# Patient Record
Sex: FEMALE | Race: WHITE | NOT HISPANIC OR LATINO
[De-identification: names, ages, dates, MRNs, and addresses within clinical notes are randomized per-mention and may not be internally consistent; named-entity substitution may affect disease eponyms.]

---

## 2017-01-24 ENCOUNTER — APPOINTMENT (OUTPATIENT)
Dept: PULMONOLOGY | Facility: CLINIC | Age: 62
End: 2017-01-24

## 2017-02-28 ENCOUNTER — APPOINTMENT (OUTPATIENT)
Dept: PULMONOLOGY | Facility: CLINIC | Age: 62
End: 2017-02-28

## 2017-03-21 ENCOUNTER — APPOINTMENT (OUTPATIENT)
Dept: PULMONOLOGY | Facility: CLINIC | Age: 62
End: 2017-03-21

## 2017-03-21 VITALS
OXYGEN SATURATION: 99 % | SYSTOLIC BLOOD PRESSURE: 128 MMHG | HEART RATE: 70 BPM | BODY MASS INDEX: 40.77 KG/M2 | WEIGHT: 245 LBS | DIASTOLIC BLOOD PRESSURE: 80 MMHG

## 2017-04-13 ENCOUNTER — FORM ENCOUNTER (OUTPATIENT)
Age: 62
End: 2017-04-13

## 2017-04-14 ENCOUNTER — OUTPATIENT (OUTPATIENT)
Dept: OUTPATIENT SERVICES | Facility: HOSPITAL | Age: 62
LOS: 1 days | End: 2017-04-14
Payer: COMMERCIAL

## 2017-04-14 ENCOUNTER — APPOINTMENT (OUTPATIENT)
Dept: CT IMAGING | Facility: CLINIC | Age: 62
End: 2017-04-14

## 2017-04-14 DIAGNOSIS — Z00.8 ENCOUNTER FOR OTHER GENERAL EXAMINATION: ICD-10-CM

## 2017-04-14 PROCEDURE — 71250 CT THORAX DX C-: CPT

## 2017-08-22 ENCOUNTER — MED ADMIN CHARGE (OUTPATIENT)
Age: 62
End: 2017-08-22

## 2017-08-22 ENCOUNTER — MEDICATION RENEWAL (OUTPATIENT)
Age: 62
End: 2017-08-22

## 2017-09-21 ENCOUNTER — RX RENEWAL (OUTPATIENT)
Age: 62
End: 2017-09-21

## 2017-09-22 ENCOUNTER — MEDICATION RENEWAL (OUTPATIENT)
Age: 62
End: 2017-09-22

## 2017-09-25 ENCOUNTER — MEDICATION RENEWAL (OUTPATIENT)
Age: 62
End: 2017-09-25

## 2017-10-21 ENCOUNTER — RX RENEWAL (OUTPATIENT)
Age: 62
End: 2017-10-21

## 2018-07-06 ENCOUNTER — APPOINTMENT (OUTPATIENT)
Dept: PULMONOLOGY | Facility: CLINIC | Age: 63
End: 2018-07-06
Payer: COMMERCIAL

## 2018-07-06 ENCOUNTER — RX RENEWAL (OUTPATIENT)
Age: 63
End: 2018-07-06

## 2018-07-06 VITALS — WEIGHT: 241 LBS | DIASTOLIC BLOOD PRESSURE: 80 MMHG | BODY MASS INDEX: 40.1 KG/M2 | SYSTOLIC BLOOD PRESSURE: 140 MMHG

## 2018-07-06 VITALS — HEART RATE: 66 BPM | OXYGEN SATURATION: 95 %

## 2018-07-06 PROCEDURE — 94010 BREATHING CAPACITY TEST: CPT

## 2018-07-06 PROCEDURE — 99215 OFFICE O/P EST HI 40 MIN: CPT | Mod: 25

## 2018-07-06 RX ORDER — AZELASTINE HYDROCHLORIDE AND FLUTICASONE PROPIONATE 137; 50 UG/1; UG/1
137-50 SPRAY, METERED NASAL TWICE DAILY
Qty: 1 | Refills: 5 | Status: DISCONTINUED | COMMUNITY
Start: 2018-07-06 | End: 2018-07-06

## 2018-07-06 RX ORDER — METOPROLOL TARTRATE 75 MG/1
TABLET, FILM COATED ORAL
Refills: 0 | Status: ACTIVE | COMMUNITY

## 2018-07-16 ENCOUNTER — FORM ENCOUNTER (OUTPATIENT)
Age: 63
End: 2018-07-16

## 2018-07-17 ENCOUNTER — APPOINTMENT (OUTPATIENT)
Dept: CT IMAGING | Facility: CLINIC | Age: 63
End: 2018-07-17
Payer: COMMERCIAL

## 2018-07-17 ENCOUNTER — OUTPATIENT (OUTPATIENT)
Dept: OUTPATIENT SERVICES | Facility: HOSPITAL | Age: 63
LOS: 1 days | End: 2018-07-17
Payer: COMMERCIAL

## 2018-07-17 DIAGNOSIS — R93.8 ABNORMAL FINDINGS ON DIAGNOSTIC IMAGING OF OTHER SPECIFIED BODY STRUCTURES: ICD-10-CM

## 2018-07-17 PROCEDURE — 71250 CT THORAX DX C-: CPT | Mod: 26

## 2018-07-17 PROCEDURE — 71250 CT THORAX DX C-: CPT

## 2018-11-18 ENCOUNTER — FORM ENCOUNTER (OUTPATIENT)
Age: 63
End: 2018-11-18

## 2018-11-19 ENCOUNTER — OUTPATIENT (OUTPATIENT)
Dept: OUTPATIENT SERVICES | Facility: HOSPITAL | Age: 63
LOS: 1 days | End: 2018-11-19
Payer: COMMERCIAL

## 2018-11-19 ENCOUNTER — APPOINTMENT (OUTPATIENT)
Dept: CT IMAGING | Facility: CLINIC | Age: 63
End: 2018-11-19
Payer: COMMERCIAL

## 2018-11-19 DIAGNOSIS — R93.89 ABNORMAL FINDINGS ON DIAGNOSTIC IMAGING OF OTHER SPECIFIED BODY STRUCTURES: ICD-10-CM

## 2018-11-19 PROCEDURE — 71250 CT THORAX DX C-: CPT

## 2018-11-19 PROCEDURE — 71250 CT THORAX DX C-: CPT | Mod: 26

## 2019-03-11 ENCOUNTER — APPOINTMENT (OUTPATIENT)
Dept: PULMONOLOGY | Facility: CLINIC | Age: 64
End: 2019-03-11
Payer: COMMERCIAL

## 2019-03-11 VITALS — OXYGEN SATURATION: 98 % | HEART RATE: 70 BPM

## 2019-03-11 VITALS — WEIGHT: 239 LBS | HEIGHT: 64.5 IN | BODY MASS INDEX: 40.31 KG/M2

## 2019-03-11 PROCEDURE — 94729 DIFFUSING CAPACITY: CPT

## 2019-03-11 PROCEDURE — 94727 GAS DIL/WSHOT DETER LNG VOL: CPT

## 2019-03-11 PROCEDURE — 99215 OFFICE O/P EST HI 40 MIN: CPT | Mod: 25

## 2019-03-11 PROCEDURE — 85018 HEMOGLOBIN: CPT | Mod: QW

## 2019-03-11 PROCEDURE — 94010 BREATHING CAPACITY TEST: CPT

## 2019-03-11 RX ORDER — POLYMYXIN B SULFATE AND TRIMETHOPRIM 10000; 1 [USP'U]/ML; MG/ML
10000-0.1 SOLUTION OPHTHALMIC
Qty: 10 | Refills: 0 | Status: DISCONTINUED | COMMUNITY
Start: 2019-02-16

## 2019-03-11 NOTE — DISCUSSION/SUMMARY
[Asthma] : asthma [Stable] : stable [Responding to Treatment] : responding to treatment [Mild Persistent] : mild persistent [None] : There are no changes in medication management [de-identified] : sinisitis and bronchietesis

## 2019-03-11 NOTE — HISTORY OF PRESENT ILLNESS
[Mild Persistent] : mild persistent [None] : The patient is not currently on any medications for ~his/her~ asthma [de-identified] : Rheumatoid arthrits , pulmonary nodules, bronchiectasis [de-identified] : greatest sputum in am

## 2019-03-11 NOTE — PHYSICAL EXAM
[General Appearance - Well Developed] : well developed [Normal Appearance] : normal appearance [Well Groomed] : well groomed [General Appearance - Well Nourished] : well nourished [No Deformities] : no deformities [General Appearance - In No Acute Distress] : no acute distress [Normal Conjunctiva] : the conjunctiva exhibited no abnormalities [Eyelids - No Xanthelasma] : the eyelids demonstrated no xanthelasmas [Normal Oropharynx] : normal oropharynx [Neck Appearance] : the appearance of the neck was normal [Neck Cervical Mass (___cm)] : no neck mass was observed [Jugular Venous Distention Increased] : there was no jugular-venous distention [Thyroid Diffuse Enlargement] : the thyroid was not enlarged [Thyroid Nodule] : there were no palpable thyroid nodules [Heart Rate And Rhythm] : heart rate and rhythm were normal [Heart Sounds] : normal S1 and S2 [Murmurs] : no murmurs present [Respiration, Rhythm And Depth] : normal respiratory rhythm and effort [Exaggerated Use Of Accessory Muscles For Inspiration] : no accessory muscle use [Auscultation Breath Sounds / Voice Sounds] : lungs were clear to auscultation bilaterally [Abdomen Soft] : soft [Abdomen Tenderness] : non-tender [Abdomen Mass (___ Cm)] : no abdominal mass palpated [Abnormal Walk] : normal gait [Gait - Sufficient For Exercise Testing] : the gait was sufficient for exercise testing [Nail Clubbing] : no clubbing of the fingernails [Cyanosis, Localized] : no localized cyanosis [Petechial Hemorrhages (___cm)] : no petechial hemorrhages [Skin Color & Pigmentation] : normal skin color and pigmentation [] : no rash [No Venous Stasis] : no venous stasis [Skin Lesions] : no skin lesions [No Skin Ulcers] : no skin ulcer [No Xanthoma] : no  xanthoma was observed [Deep Tendon Reflexes (DTR)] : deep tendon reflexes were 2+ and symmetric [Sensation] : the sensory exam was normal to light touch and pinprick [No Focal Deficits] : no focal deficits [FreeTextEntry1] : obese black female

## 2019-03-11 NOTE — CONSULT LETTER
[Dear  ___] : Dear  [unfilled], [Consult Letter:] : I had the pleasure of evaluating your patient, [unfilled]. [Please see my note below.] : Please see my note below. [Sincerely,] : Sincerely, [DrFransisco  ___] : Dr. AMIN [FreeTextEntry3] : [facility]par

## 2019-03-11 NOTE — PROCEDURE
[FreeTextEntry1] : EXAM: CT CHEST \par \par \par PROCEDURE DATE: 11/19/2018 \par \par \par \par INTERPRETATION: CLINICAL INDICATION: Follow-up of new nodules, lupus. \par \par Axial CT images of the chest are obtained without intravenous administration \par of contrast. \par \par Correlation is made with prior studies including the chest CT of July 17, \par 2018. \par \par There are no pathologically enlarged bilateral axillary lymph nodes. There \par are subcentimeter mediastinal lymph nodes which are unchanged since April \par 14, 2017. The heart size is normal. There is no pericardial effusion. There \par are no pleural effusions. \par \par Evaluation of the upper abdominal organs demonstrate no significant \par abnormality. \par \par Evaluation of the lungs demonstrate minimal bibasilar bronchiectasis \par unchanged since April 14, 2017. There is a 2 mm right upper lobe pulmonary \par nodule on image 39 of series 3 unchanged since April 14, 2017. There is a 2 \par mm left upper lobe pulmonary nodule on image 71 of series 3 unchanged since \par April 14, 2017. There is a 2 mm right upper lobe nodule on image 51 \par unchanged since April 14, 2017. There is interval resolution of the \par previously seen left upper lobe cluster of small nodules or tree-in-bud and \par the previously noted new 2 mm right lower lobe pulmonary nodules since July \par 17, 2018. There are no new lung nodules. There is no pneumonia. There are no \par central endobronchial lesions. \par \par Evaluation of the bones demonstrate degenerative changes of the spine. \par \par IMPRESSION: Interval resolution of the previously noted left upper lobe \par tree-in-bud opacities and the new 2 mm right lower lobe pulmonary nodules \par since July 17, 2018. \par \par A few other 2 mm bilateral pulmonary nodules are unchanged. \par \par \par GRETCHEN SAUNDERS M.D. ATTENDING RADIOLOGIST \par This document has been electronically signed. Nov 19 2018 2:10PM \par Chest CAT scan reviewed on PACS with the patient.\par  \par PFT'S mild pattern of abdominal obesity

## 2019-05-24 ENCOUNTER — MEDICATION RENEWAL (OUTPATIENT)
Age: 64
End: 2019-05-24

## 2019-06-18 ENCOUNTER — RX RENEWAL (OUTPATIENT)
Age: 64
End: 2019-06-18

## 2019-09-16 ENCOUNTER — APPOINTMENT (OUTPATIENT)
Dept: PULMONOLOGY | Facility: CLINIC | Age: 64
End: 2019-09-16

## 2019-11-08 ENCOUNTER — APPOINTMENT (OUTPATIENT)
Dept: PULMONOLOGY | Facility: CLINIC | Age: 64
End: 2019-11-08

## 2020-01-15 ENCOUNTER — RX RENEWAL (OUTPATIENT)
Age: 65
End: 2020-01-15

## 2020-03-31 ENCOUNTER — RX RENEWAL (OUTPATIENT)
Age: 65
End: 2020-03-31

## 2020-06-18 ENCOUNTER — APPOINTMENT (OUTPATIENT)
Dept: PULMONOLOGY | Facility: CLINIC | Age: 65
End: 2020-06-18

## 2020-10-20 ENCOUNTER — APPOINTMENT (OUTPATIENT)
Dept: PULMONOLOGY | Facility: CLINIC | Age: 65
End: 2020-10-20
Payer: COMMERCIAL

## 2020-10-20 VITALS — SYSTOLIC BLOOD PRESSURE: 130 MMHG | DIASTOLIC BLOOD PRESSURE: 70 MMHG | HEART RATE: 63 BPM | OXYGEN SATURATION: 96 %

## 2020-10-20 PROCEDURE — 99214 OFFICE O/P EST MOD 30 MIN: CPT | Mod: 25

## 2020-10-20 PROCEDURE — 99072 ADDL SUPL MATRL&STAF TM PHE: CPT

## 2020-10-20 RX ORDER — MOMETASONE FUROATE 220 UG/1
220 INHALANT RESPIRATORY (INHALATION)
Qty: 3 | Refills: 0 | Status: DISCONTINUED | COMMUNITY
Start: 2020-01-15 | End: 2020-10-20

## 2020-10-20 RX ORDER — MOMETASONE FUROATE 220 UG/1
220 INHALANT RESPIRATORY (INHALATION) DAILY
Qty: 3 | Refills: 1 | Status: DISCONTINUED | COMMUNITY
Start: 2019-05-24 | End: 2020-10-20

## 2020-10-20 RX ORDER — MELOXICAM 7.5 MG/1
7.5 TABLET ORAL
Refills: 0 | Status: DISCONTINUED | COMMUNITY
Start: 2017-03-21 | End: 2020-10-20

## 2020-10-20 NOTE — DISCUSSION/SUMMARY
[Stable] : stable [Asthma] : asthma [Responding to Treatment] : responding to treatment [Mild Persistent] : mild persistent [None] : There are no changes in medication management [FreeTextEntry1] : Needs f/u CCT re bronchiectasis and prior Tree n bud changes [de-identified] : sinisitis and bronchietesis [de-identified] : Trial off asmanex

## 2020-10-20 NOTE — CONSULT LETTER
[Dear  ___] : Dear  [unfilled], [Consult Letter:] : I had the pleasure of evaluating your patient, [unfilled]. [Please see my note below.] : Please see my note below. [Sincerely,] : Sincerely, [FreeTextEntry3] : [facility]

## 2020-10-20 NOTE — HISTORY OF PRESENT ILLNESS
[Mild Persistent] : mild persistent [None] : The patient is not currently on any medications for ~his/her~ asthma [Never] : never [Class I - No Symptoms and No Limitations] : I [de-identified] : Rheumatoid arthrits , pulmonary nodules, bronchiectasis [de-identified] : using Asmanex 1 PPD but no longer cover by insurance

## 2020-11-06 ENCOUNTER — OUTPATIENT (OUTPATIENT)
Dept: OUTPATIENT SERVICES | Facility: HOSPITAL | Age: 65
LOS: 1 days | End: 2020-11-06
Payer: COMMERCIAL

## 2020-11-06 ENCOUNTER — RESULT REVIEW (OUTPATIENT)
Age: 65
End: 2020-11-06

## 2020-11-06 ENCOUNTER — APPOINTMENT (OUTPATIENT)
Dept: CT IMAGING | Facility: CLINIC | Age: 65
End: 2020-11-06
Payer: COMMERCIAL

## 2020-11-06 DIAGNOSIS — R93.89 ABNORMAL FINDINGS ON DIAGNOSTIC IMAGING OF OTHER SPECIFIED BODY STRUCTURES: ICD-10-CM

## 2020-11-06 PROCEDURE — 71250 CT THORAX DX C-: CPT | Mod: 26

## 2020-11-06 PROCEDURE — 71250 CT THORAX DX C-: CPT

## 2020-11-29 ENCOUNTER — OUTPATIENT (OUTPATIENT)
Dept: OUTPATIENT SERVICES | Facility: HOSPITAL | Age: 65
LOS: 1 days | End: 2020-11-29
Payer: COMMERCIAL

## 2020-11-29 ENCOUNTER — APPOINTMENT (OUTPATIENT)
Dept: MRI IMAGING | Facility: CLINIC | Age: 65
End: 2020-11-29
Payer: COMMERCIAL

## 2020-11-29 DIAGNOSIS — Z00.8 ENCOUNTER FOR OTHER GENERAL EXAMINATION: ICD-10-CM

## 2020-11-29 PROCEDURE — 71552 MRI CHEST W/O & W/DYE: CPT

## 2020-11-29 PROCEDURE — A9585: CPT

## 2020-11-29 PROCEDURE — 71552 MRI CHEST W/O & W/DYE: CPT | Mod: 26

## 2020-12-03 ENCOUNTER — NON-APPOINTMENT (OUTPATIENT)
Age: 65
End: 2020-12-03

## 2021-04-09 ENCOUNTER — APPOINTMENT (OUTPATIENT)
Dept: DISASTER EMERGENCY | Facility: CLINIC | Age: 66
End: 2021-04-09

## 2021-04-19 ENCOUNTER — APPOINTMENT (OUTPATIENT)
Dept: DISASTER EMERGENCY | Facility: CLINIC | Age: 66
End: 2021-04-19

## 2021-04-20 LAB — SARS-COV-2 N GENE NPH QL NAA+PROBE: NOT DETECTED

## 2021-04-22 ENCOUNTER — APPOINTMENT (OUTPATIENT)
Dept: PULMONOLOGY | Facility: CLINIC | Age: 66
End: 2021-04-22
Payer: COMMERCIAL

## 2021-04-22 VITALS — HEIGHT: 65 IN | TEMPERATURE: 97.6 F | WEIGHT: 252 LBS | BODY MASS INDEX: 41.99 KG/M2

## 2021-04-22 VITALS — OXYGEN SATURATION: 98 % | HEART RATE: 75 BPM | SYSTOLIC BLOOD PRESSURE: 130 MMHG | DIASTOLIC BLOOD PRESSURE: 88 MMHG

## 2021-04-22 DIAGNOSIS — Z87.09 PERSONAL HISTORY OF OTHER DISEASES OF THE RESPIRATORY SYSTEM: ICD-10-CM

## 2021-04-22 PROCEDURE — 99072 ADDL SUPL MATRL&STAF TM PHE: CPT

## 2021-04-22 PROCEDURE — 99215 OFFICE O/P EST HI 40 MIN: CPT | Mod: 25

## 2021-04-22 PROCEDURE — 94010 BREATHING CAPACITY TEST: CPT

## 2021-04-22 RX ORDER — AZELASTINE HYDROCHLORIDE 137 UG/1
0.1 SPRAY, METERED NASAL DAILY
Qty: 3 | Refills: 1 | Status: ACTIVE | COMMUNITY
Start: 2018-07-06

## 2021-04-22 NOTE — DISCUSSION/SUMMARY
[Asthma] : asthma [Stable] : stable [Responding to Treatment] : responding to treatment [Mild Persistent] : mild persistent [None] : There are no changes in medication management [FreeTextEntry1] : Previous findings of tree in bud formation have resolved without any evidence of bronchiectasis. The thymic lesion noted appears to be a cyst and benign. [de-identified] : sinisitis and bronchietesis [de-identified] : Trial off asmanex

## 2021-04-22 NOTE — HISTORY OF PRESENT ILLNESS
[Never] : never [Class I - No Symptoms and No Limitations] : I [Mild Persistent] : mild persistent [Good Control] : peak flow has been good [None] : The patient is not currently on any medications for ~his/her~ asthma [TextBox_4] : 66-year-old female with a history of mild persistent asthma, seen today in followup for her asthma, as well as an anterior mediastinal mass. She denies any complaints of chest pains, weight loss, fevers, chills, myalgias, arthralgias. [ESS] : 0 [More Frequent Use Needed Recently] : normal [de-identified] : Rheumatoid arthrits

## 2021-04-22 NOTE — CONSULT LETTER
[Dear  ___] : Dear  [unfilled], [Consult Letter:] : I had the pleasure of evaluating your patient, [unfilled]. [Please see my note below.] : Please see my note below. [Sincerely,] : Sincerely, [FreeTextEntry3] : [facility]par

## 2021-07-29 ENCOUNTER — TRANSCRIPTION ENCOUNTER (OUTPATIENT)
Age: 66
End: 2021-07-29

## 2022-01-11 NOTE — COUNSELING
EMERGENCY DEPARTMENT ENCOUNTER      CHIEF COMPLAINT    Chief Complaint   Patient presents with   • Shortness of Breath       HPI    Brannon Ewing is a 47 year old male with obesity, prior tobacco usage, sleep apnea, and now here with shortness of breath. The patient was at baseline until approximately a week and half ago when developing cough, congestion, and malaise.  These symptoms have progressed and he has also developed shortness of breath and body aches.  He did try ibuprofen approximately 3 hours ago.  He has not tried any other medicines today.  Although having diffuse body aches he denies any chest pain.  As below he does have a headache.  He has had fevers at home.  He did test positive for COVID.  He has not been vaccinated (Education on vaccination provided).The patient denies a personal cardiac history. The patient denies new lower extremity edema.  He states he no longer smokes cigarettes.*    ALLERGIES    ALLERGIES:   Allergen Reactions   • Insect Extract Other (See Comments)     Cockroaches     • Mold   (Environmental) Other (See Comments)       CURRENT MEDICATIONS    Current Facility-Administered Medications   Medication Dose Route Frequency Provider Last Rate Last Admin   • acetaminophen (TYLENOL) tablet 650 mg  650 mg Oral Once PRN Jose D Paz MD       • sodium chloride 0.9 % flush bag 25 mL  25 mL Intravenous PRN Jose D Paz MD       • sodium chloride (PF) 0.9 % injection 2 mL  2 mL Intracatheter 2 times per day Jose D Paz MD       • acetaminophen (TYLENOL) tablet 1,000 mg  1,000 mg Oral Once Jose D Paz MD       • prochlorperazine (COMPAZINE) injection 10 mg  10 mg Intravenous Once Jose D Paz MD       • diphenhydrAMINE (BENADRYL) injection 25 mg  25 mg Intravenous Once Jose D Paz MD       • dexamethasone (PF) (DECADRON) injection 10 mg  10 mg Intravenous Once Jose D Paz MD       • sodium chloride (NORMAL SALINE) 
0.9 % bolus 500 mL  500 mL Intravenous Once Jose D Paz MD         Current Outpatient Medications   Medication Sig Dispense Refill   • benzonatate (TESSALON PERLES) 200 MG capsule Take 1 capsule by mouth 3 times daily as needed for Cough. 20 capsule 0   • guaiFENesin-codeine (Guaiatussin AC) 100-10 MG/5ML liquid Take 10 mLs by mouth 3 times daily as needed for Cough. 120 mL 0   • ondansetron (Zofran ODT) 4 MG disintegrating tablet Place 1 tablet onto the tongue every 8 hours as needed for Nausea. 20 tablet 0   • hydrOXYzine (ATARAX) 50 MG tablet Take 1 tablet by mouth every 6 hours as needed for Anxiety. 120 tablet 3   • cannabidiol (CBD) oil Take 300 mg by mouth daily. Pt takes 900-1500 per day. 30 mL 0   • PARoxetine (PAXIL) 40 MG tablet Take 1 tablet by mouth every morning. 30 tablet 1   • atenolol (TENORMIN) 50 MG tablet Take 1 tablet by mouth daily. 90 tablet 2   • amLODIPine (NORVASC) 10 MG tablet Take 0.5 tablets by mouth daily. 45 tablet 3       PAST MEDICAL HISTORY    Past Medical History:   Diagnosis Date   • Anxiety     Panic disorder   • Depressive disorder    • Obesity (BMI 30-39.9)    • GRACE (obstructive sleep apnea) 2018   • Tobacco abuse        SURGICAL HISTORY    Past Surgical History:   Procedure Laterality Date   • Testicle surgery         SOCIAL HISTORY    Social History     Tobacco Use   • Smoking status: Former Smoker     Packs/day: 0.25     Years: 20.00     Pack years: 5.00     Types: Cigarettes     Start date: 1994     Quit date: 2020     Years since quittin.0   • Smokeless tobacco: Never Used   Substance Use Topics   • Alcohol use: Yes     Alcohol/week: 0.0 - 2.0 standard drinks   • Drug use: Not Currently     Types: Marijuana     Comment: pt to start wellbutrin for smoking cessation. Soda 2 cans per days       FAMILY HISTORY    Family History   Problem Relation Age of Onset   • Hypertension Mother    • Asthma Mother    • Glaucoma Mother    • Hypertension Father  
  • Heart disease Father    • Asthma Brother    • Cataracts Maternal Grandmother    • Diabetes Maternal Grandmother    • Cataracts Paternal Grandmother    • Cancer Paternal Grandmother    • Cataracts Maternal Grandfather    • Sleep Apnea Maternal Grandfather    • Diabetes Maternal Grandfather    • Cataracts Paternal Grandfather    • Bleeding Disorder Paternal Grandfather        REVIEW OF SYSTEMS    Shortness of breath    Constitutional:  Has had fever or chills.   Eyes:  Denies change in visual acuity.   HENT:  Please see HPI  Respiratory:  See HPI.  Cardiovascular:  See HPI.  GI:  Denies abdominal pain vomiting, bloody stools or diarrhea.   :  Denies dysuria.   Musculoskeletal:  Does have diffuse body aches.  Denies any specific back pain or joint pain.   Integument:  Denies rash.   Neurologic:  Denies focal weakness or sensory changes.  Has had a diffuse achy throbbing headache that is moderate but occasionally severe.    PHYSICAL EXAM    ED Triage Vitals [01/11/22 0838]   /81   Heart Rate (!) 122   Resp (!) 26   Temp 99.8 °F (37.7 °C)   SpO2 91 %     Pulse Ox Interpretation:  Normal on supplemental but low on room air  Constitutional:  Well developed, well nourished, no acute distress, non-toxic appearance.   HENT:  Atraumatic. External ears normal. Nose normal. Oropharynx with moderately dry mucous membranes  Neck: Normal range of motion. No tenderness. Supple.   Respiratory: Clear to auscultation bilaterally. No increased work of breathing. Normal expiratory phase. Symmetric chest rise.  Cardiovascular:  Increased rate.  Regular rhythm with no murmurs rubs or gallops. DP, PT, and radial pulses equal and symmetric.  GI:  Soft and nondistended. Nontender. No appreciable hepatomegaly, splenomegaly, mass, rebound or guarding.   Musculoskeletal:  No edema. No tenderness. No deformities. Back - no tenderness.   Integument:  Well hydrated, no rash.   Neurologic:  Cranial nerves upon detailed examination were 
intact (autonomic functioning and detailed visual acuity was not directly tested). Sensation and strength in the upper and lower extremities were intact.  No limb ataxia. Speech is normal.      EKG  - EKG tracing interpreted by myself as below  Sinus rhythm with a rate of 120. No significant ST-segment elevation or depression in contiguos leads. No T-wave inversions in contiguous leads. Normal P-wave morphology and MS-interval. Normal QRS duration and morphology.Normal QT. No Brugada. No Wellens.     Comparison [id number]     Component   Ventricular Rate EKG/Min (BPM)   87    Atrial Rate (BPM)   87    MS-Interval (MSEC)   166    QRS-Interval (MSEC)   88    QT-Interval (MSEC)   358    QTc   431    P Axis (Degrees)   66    R Axis (Degrees)   69    T Axis (Degrees)   66    REPORT TEXT   Normal sinus rhythm   Normal ECG   When compared with ECG of   2020 10:01,   No significant change was found   Agree   Confirmed by DR. TIMA FUENTES ([id number]),  Jaylon Berg ([id number]) on 2022 8:22:15 AM        RADIOLOGY    The images and image results were reviewed by myself. The results were subsequently discussed with the patient.  CT Chest PE Imaging   Final Result   IMPRESSION:      1.  No pulmonary embolus      2.  Extensive pneumonia        Should be noted that images appeared consistent with coronavirus pneumonia on my read    LABS    Labs Reviewed   CBC WITH AUTOMATED DIFFERENTIAL (PERFORMABLE ONLY) - Abnormal; Notable for the following components:       Result Value    RDW-SD 38.5 (*)     Absolute Lymphocytes 0.6 (*)     All other components within normal limits    Narrative:     This is an appended report.  These results have been appended to a previously verified report.   CBC WITH DIFFERENTIAL    Narrative:     The following orders were created for panel order CBC with Automated Differential.                  Procedure                               Abnormality         Status                          
           ---------                               -----------         ------                                     CBC with Automated Dif...[[id number]]  Abnormal            Final result                                                 Please view results for these tests on the individual orders.   RAINBOW DRAW    Narrative:     The following orders were created for panel order [place] Draw Light Blue Top Collected? 1 Label; Red Top Collected? No Labels; Light Green Top Collected? 1 Label; Lavender Top Collected? 1 Label; Gold Top Collected? 1 Label; Pink Top Collected? No Labels; Grey Top Collected? 1 Label.                  Procedure                               Abnormality         Status                                     ---------                               -----------         ------                                     Light Blue Top[[id number]]                                 In process                                 Light Green Top[[id number]]                                In process                                 Lavender Top[[id number]]                                   In process                                 Gold Top[[id number]]                                       In process                                 Grey Top Tube[[id number]]                                  In process                                                   Please view results for these tests on the individual orders.   LIGHT BLUE TOP   LIGHT GREEN TOP   LAVENDER TOP   GOLD TOP   GREY TOP TUBE   C REACTIVE PROTEIN   PROTHROMBIN TIME   COMPREHENSIVE METABOLIC PANEL   CREATINE KINASE   PROCALCITONIN   LACTATE DEHYDROGENASE   FERRITIN   TROPONIN I, HIGH SENSITIVITY   D DIMER, QUANTITATIVE   NT PROBNP     ED COURSE & MEDICAL DECISION MAKING    Patient here with shortness of breath. Differential diagnosis includes pulmonary embolism/PE, cardiac ischemia/infarction, congestive heart failure/CHF, obstructive lung disease, 
[FreeTextEntry2] : Never smoker
pneumonia, pneumothorax, soft tissue neck infection or airway obstruction, or upper respiratory infection.  Patient's overall clinical scenario most consistent with coronavirus.  Testing initiated to help rule out other cardiopulmonary etiologies like pulmonary embolism and/or complications of coronavirus like superimposed bacterial infections.  Doubt vascular etiologies.  Doubt intra-abdominal etiology.  Treatment and Education provided.  even though he does have COVID he is likely moderately dehydrated.  Rehydrated gently.    Patient also here with a headache. Differential diagnosis includes infections (like meningitis, mastoiditis, or systemic infection), stroke, intracranial bleeding like cerebellar, carotid artery or vertebral basilar dissection, inflammatory etiologies (like temporal arteritis), other vascular etiologies, toxicologic/medications, metabolic, traumatic injury, venous sinus thrombosis, occular etiologies(like glaucoma), or hydrocephalus/increased intracranial pressure. Doubt dangerous etiology for the headache. Although etiologies like migraines are possible, it was explained that they are diagnoses of exclusion. Currently doubt central infection.  Currently doubt bleeding or a vascular etiology given exam and overall clinical scenario. Overall, currently doubt a serious etiology for the headache that would require emergent treatment. However, the patient was apprised that a serious etiology can not be completely ruled-out. Although the risks of further testing at this time are in excess of the benefit, vigilance should be maintained for changes in symptoms and there should be prompt follow-up for return of/continued symptoms.      Interventions: Examination, patient teaching, EKG, Labs  Consultations:  Hospitalist  Status upon Re-Evaluation: Improved symptoms and continues to be hemodynamically stable and otherwise well-appearing.  Did later discuss medications and was due for NSAID.  Toradol 
ordered.  No other new symptoms in the interim.  Did have improvement in heart rate with fluids.  Vital sign interpretation:   Visit Vitals  /81 (BP Location: LUE - Left upper extremity, Patient Position: Sitting)   Pulse (!) 122   Temp 99.8 °F (37.7 °C) (Oral)   Resp (!) 26   Ht 5' 11\" (1.803 m)   Wt (!) 138 kg (304 lb 3.8 oz)   SpO2 93%   BMI 42.43 kg/m²    Normal heart rate, mildly elevated respiratory rate, mildly elevated but acceptable blood pressure, and low oxygen saturation on room air.  Improved oxygenation on supplemental oxygen  Disposition: Admission for further workup and management    The patient was apprised of the results, current diagnosis, current prognosis, and the plan. The patient was in agreement with the plan as it occurred and understood the risks and benefits of pursuing further diagnostic testing/treatment and preferred the plan as it is.     Diagnosis:   Shortness of breath - acute  Coronavirus-acute and suspected   Hypoxia-acute     Jose D Paz MD  01/11/22 1500    
[FreeTextEntry1] : Shunt setting dialed down from 4 to 2 today. Patient tolerates procedure well.

## 2022-02-03 ENCOUNTER — RX CHANGE (OUTPATIENT)
Age: 67
End: 2022-02-03

## 2022-02-03 RX ORDER — ALBUTEROL SULFATE 2.5 MG/3ML
(2.5 MG/3ML) SOLUTION RESPIRATORY (INHALATION)
Qty: 1620 | Refills: 3 | Status: ACTIVE | COMMUNITY
Start: 2017-03-21 | End: 1900-01-01

## 2022-02-14 ENCOUNTER — APPOINTMENT (OUTPATIENT)
Dept: PULMONOLOGY | Facility: CLINIC | Age: 67
End: 2022-02-14
Payer: COMMERCIAL

## 2022-02-14 VITALS
WEIGHT: 260 LBS | SYSTOLIC BLOOD PRESSURE: 128 MMHG | BODY MASS INDEX: 43.32 KG/M2 | HEIGHT: 65 IN | HEART RATE: 76 BPM | DIASTOLIC BLOOD PRESSURE: 80 MMHG | OXYGEN SATURATION: 96 % | RESPIRATION RATE: 16 BRPM

## 2022-02-14 PROCEDURE — 99214 OFFICE O/P EST MOD 30 MIN: CPT

## 2022-02-14 RX ORDER — FLUTICASONE PROPIONATE 50 UG/1
50 SPRAY, METERED NASAL DAILY
Qty: 3 | Refills: 3 | Status: DISCONTINUED | COMMUNITY
Start: 2018-07-06 | End: 2022-02-14

## 2022-02-14 NOTE — DISCUSSION/SUMMARY
[Asthma] : asthma [Mild Persistent] : mild persistent [Responding to Treatment] : responding to treatment [Acute Sinusitis] : acute sinusitis [Mild] : mild [Viral URI] : viral upper respiratory infection [Sinus Irrigation] : sinus irrigation [Decompensated] : decompensated [Medication Changes Per Orders] : Medication changes are as documented in orders [de-identified] : sinisitis and bronchietesis

## 2022-02-14 NOTE — HISTORY OF PRESENT ILLNESS
[Never] : never [Class I - No Symptoms and No Limitations] : I [Mild Persistent] : mild persistent [Wheezing] : worsened wheezing [Chest Tightness Or Heavy Pressure] : worsened chest tightness [Good Control] : peak flow has been good [ESS] : 0 [Chest Pain] : no chest pain or discomfort [Cough at Night] : not awakened at night with cough [Wheezing at Night] : not awakened at night because of wheezing or trouble breathing [More Frequent Use Needed Recently] : normal [de-identified] : Rheumatoid arthrits  [FreeTextEntry3] : mild sinus HA, No sputum [de-identified] : ran out of meds

## 2022-02-14 NOTE — CONSULT LETTER
[Dear  ___] : Dear  [unfilled], [Consult Letter:] : I had the pleasure of evaluating your patient, [unfilled]. [Please see my note below.] : Please see my note below. [Sincerely,] : Sincerely, [FreeTextEntry3] : [facility]

## 2022-03-28 ENCOUNTER — APPOINTMENT (OUTPATIENT)
Dept: PULMONOLOGY | Facility: CLINIC | Age: 67
End: 2022-03-28
Payer: COMMERCIAL

## 2022-03-28 VITALS
SYSTOLIC BLOOD PRESSURE: 122 MMHG | OXYGEN SATURATION: 98 % | RESPIRATION RATE: 16 BRPM | DIASTOLIC BLOOD PRESSURE: 78 MMHG | HEART RATE: 72 BPM

## 2022-03-28 VITALS — HEIGHT: 64.5 IN | WEIGHT: 256 LBS | BODY MASS INDEX: 43.17 KG/M2

## 2022-03-28 DIAGNOSIS — Z23 ENCOUNTER FOR IMMUNIZATION: ICD-10-CM

## 2022-03-28 PROCEDURE — 94010 BREATHING CAPACITY TEST: CPT

## 2022-03-28 PROCEDURE — 99214 OFFICE O/P EST MOD 30 MIN: CPT | Mod: 25

## 2022-03-28 NOTE — HISTORY OF PRESENT ILLNESS
[Never] : never [Class I - No Symptoms and No Limitations] : I [Mild Persistent] : mild persistent [Wheezing] : worsened wheezing [Chest Tightness Or Heavy Pressure] : worsened chest tightness [Good Control] : peak flow has been good [___ Times a Week] : of [unfilled] time(s) a week [Adherent] : the patient is adherent with ~his/her~ medication regimen [ESS] : 0 [Chest Pain] : no chest pain or discomfort [Cough at Night] : not awakened at night with cough [Wheezing at Night] : not awakened at night because of wheezing or trouble breathing [More Frequent Use Needed Recently] : normal [de-identified] : Rheumatoid arthrits  [FreeTextEntry3] : mild sinus HA, No sputum [de-identified] : MTX for RA

## 2022-03-28 NOTE — DISCUSSION/SUMMARY
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  Contrast CT will be performed. [de-identified] : sinisitis and bronchietesis

## 2022-05-06 ENCOUNTER — APPOINTMENT (OUTPATIENT)
Dept: CT IMAGING | Facility: CLINIC | Age: 67
End: 2022-05-06
Payer: COMMERCIAL

## 2022-05-06 ENCOUNTER — OUTPATIENT (OUTPATIENT)
Dept: OUTPATIENT SERVICES | Facility: HOSPITAL | Age: 67
LOS: 1 days | End: 2022-05-06
Payer: COMMERCIAL

## 2022-05-06 DIAGNOSIS — J98.59 OTHER DISEASES OF MEDIASTINUM, NOT ELSEWHERE CLASSIFIED: ICD-10-CM

## 2022-05-06 PROCEDURE — 71260 CT THORAX DX C+: CPT | Mod: 26

## 2022-05-06 PROCEDURE — 71260 CT THORAX DX C+: CPT

## 2022-05-10 ENCOUNTER — NON-APPOINTMENT (OUTPATIENT)
Age: 67
End: 2022-05-10

## 2022-05-23 LAB — SARS-COV-2 N GENE NPH QL NAA+PROBE: NOT DETECTED

## 2022-05-24 ENCOUNTER — APPOINTMENT (OUTPATIENT)
Dept: PULMONOLOGY | Facility: CLINIC | Age: 67
End: 2022-05-24

## 2022-06-08 DIAGNOSIS — Z01.812 ENCOUNTER FOR PREPROCEDURAL LABORATORY EXAMINATION: ICD-10-CM

## 2022-06-20 ENCOUNTER — APPOINTMENT (OUTPATIENT)
Dept: PULMONOLOGY | Facility: CLINIC | Age: 67
End: 2022-06-20
Payer: COMMERCIAL

## 2022-06-20 VITALS
HEART RATE: 83 BPM | OXYGEN SATURATION: 97 % | SYSTOLIC BLOOD PRESSURE: 138 MMHG | DIASTOLIC BLOOD PRESSURE: 78 MMHG | RESPIRATION RATE: 16 BRPM

## 2022-06-20 VITALS — WEIGHT: 255 LBS | BODY MASS INDEX: 43.54 KG/M2 | HEIGHT: 64 IN

## 2022-06-20 DIAGNOSIS — Z01.818 ENCOUNTER FOR OTHER PREPROCEDURAL EXAMINATION: ICD-10-CM

## 2022-06-20 DIAGNOSIS — J98.59 OTHER DISEASES OF MEDIASTINUM, NOT ELSEWHERE CLASSIFIED: ICD-10-CM

## 2022-06-20 LAB — SARS-COV-2 N GENE NPH QL NAA+PROBE: NOT DETECTED

## 2022-06-20 PROCEDURE — 99214 OFFICE O/P EST MOD 30 MIN: CPT | Mod: 25

## 2022-06-20 PROCEDURE — 94010 BREATHING CAPACITY TEST: CPT

## 2022-06-20 RX ORDER — PREDNISONE 20 MG/1
20 TABLET ORAL DAILY
Qty: 10 | Refills: 0 | Status: DISCONTINUED | COMMUNITY
Start: 2022-02-14 | End: 2022-06-20

## 2022-06-20 NOTE — CONSULT LETTER
[Dear  ___] : Dear  [unfilled], [Consult Letter:] : I had the pleasure of evaluating your patient, [unfilled]. [Please see my note below.] : Please see my note below. [Sincerely,] : Sincerely, [FreeTextEntry3] : [facility]par

## 2022-06-20 NOTE — REASON FOR VISIT
[Follow-Up] : a follow-up visit [Asthma] : asthma [Bronchiectasis] : bronchiectasis [Pulmonary Nodules] : pulmonary nodules [Pre-op Risk Stratification] : pre-op risk stratification [TextBox_44] : laparoscopic hysterectomy

## 2022-06-20 NOTE — HISTORY OF PRESENT ILLNESS
[Never] : never [Class I - No Symptoms and No Limitations] : I [Mild Persistent] : mild persistent [Wheezing] : worsened wheezing [Chest Tightness Or Heavy Pressure] : worsened chest tightness [Good Control] : peak flow has been good [___ Times a Week] : of [unfilled] time(s) a week [Adherent] : the patient is adherent with ~his/her~ medication regimen [TextBox_4] : Patient seen today preop for robotic hysterectomy.  Prior history of reaction to general anesthesia with  30 years ago x2.  History positive for residual thymic gland [ESS] : 0 [Chest Pain] : no chest pain or discomfort [Cough at Night] : not awakened at night with cough [Wheezing at Night] : not awakened at night because of wheezing or trouble breathing [More Frequent Use Needed Recently] : normal [de-identified] : Rheumatoid arthrits  [FreeTextEntry3] : mild sinus HA, No sputum [de-identified] : MTX for RA

## 2022-06-20 NOTE — DISCUSSION/SUMMARY
[Chronic Sinusitis] : chronic sinusitis [Mild] : mild [Viral URI] : viral upper respiratory infection [Sinus Irrigation] : sinus irrigation [Asthma] : asthma [Decompensated] : decompensated [Responding to Treatment] : responding to treatment [Mild Persistent] : mild persistent [Medication Changes Per Orders] : Medication changes are as documented in orders [de-identified] : sinisitis and bronchietesis [FreeTextEntry1] : 67-year-old female seen today for preop evaluation for proposed laparoscopic hysterectomy.  No pulmonary contraindication exists for this procedure.  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  Please note that the patient does have a history of a reaction to general anesthesia 30 years ago which should be investigated by her anesthesia attending.

## 2022-06-27 ENCOUNTER — APPOINTMENT (OUTPATIENT)
Dept: PULMONOLOGY | Facility: CLINIC | Age: 67
End: 2022-06-27

## 2022-09-26 ENCOUNTER — APPOINTMENT (OUTPATIENT)
Dept: PULMONOLOGY | Facility: CLINIC | Age: 67
End: 2022-09-26

## 2022-12-13 ENCOUNTER — OFFICE (OUTPATIENT)
Dept: URBAN - METROPOLITAN AREA CLINIC 115 | Facility: CLINIC | Age: 67
Setting detail: OPHTHALMOLOGY
End: 2022-12-13
Payer: COMMERCIAL

## 2022-12-13 DIAGNOSIS — H16.221: ICD-10-CM

## 2022-12-13 DIAGNOSIS — H43.812: ICD-10-CM

## 2022-12-13 DIAGNOSIS — H10.45: ICD-10-CM

## 2022-12-13 DIAGNOSIS — Z79.891: ICD-10-CM

## 2022-12-13 DIAGNOSIS — H16.222: ICD-10-CM

## 2022-12-13 DIAGNOSIS — H25.13: ICD-10-CM

## 2022-12-13 DIAGNOSIS — H16.223: ICD-10-CM

## 2022-12-13 DIAGNOSIS — M32.9: ICD-10-CM

## 2022-12-13 PROCEDURE — 92250 FUNDUS PHOTOGRAPHY W/I&R: CPT | Performed by: OPHTHALMOLOGY

## 2022-12-13 PROCEDURE — 92083 EXTENDED VISUAL FIELD XM: CPT | Performed by: OPHTHALMOLOGY

## 2022-12-13 PROCEDURE — 83861 MICROFLUID ANALY TEARS: CPT | Performed by: OPHTHALMOLOGY

## 2022-12-13 PROCEDURE — 99214 OFFICE O/P EST MOD 30 MIN: CPT | Performed by: OPHTHALMOLOGY

## 2022-12-13 ASSESSMENT — TONOMETRY
OS_IOP_MMHG: 18
OD_IOP_MMHG: 20

## 2022-12-13 ASSESSMENT — REFRACTION_CURRENTRX
OD_VPRISM_DIRECTION: PROGS
OS_AXIS: 081
OD_SPHERE: PLANO
OD_ADD: +2.00
OS_CYLINDER: -0.50
OD_OVR_VA: 20/
OS_OVR_VA: 20/
OS_VPRISM_DIRECTION: PROGS
OD_AXIS: 093
OS_ADD: +2.25
OS_SPHERE: -0.50
OD_CYLINDER: -1.00

## 2022-12-13 ASSESSMENT — REFRACTION_AUTOREFRACTION
OS_AXIS: 082
OD_CYLINDER: -1.25
OD_SPHERE: +0.50
OS_CYLINDER: -1.25
OS_SPHERE: +0.50
OD_AXIS: 095

## 2022-12-13 ASSESSMENT — CONFRONTATIONAL VISUAL FIELD TEST (CVF)
OS_FINDINGS: FULL
OD_FINDINGS: FULL

## 2022-12-13 ASSESSMENT — REFRACTION_MANIFEST
OS_ADD: +2.50
OS_VA1: 20/20
OD_AXIS: 090
OS_SPHERE: -0.50
OS_AXIS: 080
OD_CYLINDER: -1.00
OD_SPHERE: PLANO
OS_CYLINDER: -0.75
OD_ADD: +2.50
OD_VA1: 20/20

## 2022-12-13 ASSESSMENT — SPHEQUIV_DERIVED
OS_SPHEQUIV: -0.875
OS_SPHEQUIV: -0.125
OD_SPHEQUIV: -0.125

## 2022-12-13 ASSESSMENT — SUPERFICIAL PUNCTATE KERATITIS (SPK)
OS_SPK: 1+
OD_SPK: 1+

## 2022-12-13 ASSESSMENT — AXIALLENGTH_DERIVED
OD_AL: 24.5504
OS_AL: 24.7693
OS_AL: 24.4513

## 2022-12-13 ASSESSMENT — KERATOMETRY
OD_K2POWER_DIOPTERS: 41.50
OS_K2POWER_DIOPTERS: 41.50
OS_K1POWER_DIOPTERS: 41.25
OS_AXISANGLE_DEGREES: 117
OD_K1POWER_DIOPTERS: 40.75
OD_AXISANGLE_DEGREES: 054

## 2022-12-13 ASSESSMENT — VISUAL ACUITY
OD_BCVA: 20/25+1
OS_BCVA: 20/20

## 2022-12-19 ENCOUNTER — OFFICE (OUTPATIENT)
Dept: URBAN - METROPOLITAN AREA CLINIC 115 | Facility: CLINIC | Age: 67
Setting detail: OPHTHALMOLOGY
End: 2022-12-19
Payer: COMMERCIAL

## 2022-12-19 DIAGNOSIS — H01.004: ICD-10-CM

## 2022-12-19 DIAGNOSIS — H01.001: ICD-10-CM

## 2022-12-19 PROBLEM — M32.9 SYSTEMIC LUPUS ERYTHEMATOSUS, UNSPECIFIED: Status: ACTIVE | Noted: 2022-12-19

## 2022-12-19 PROBLEM — H16.223 DRY EYE SYNDROME K SICCA; RIGHT EYE, LEFT EYE, BOTH EYES: Status: ACTIVE | Noted: 2022-12-13

## 2022-12-19 PROBLEM — H16.222 DRY EYE SYNDROME K SICCA; RIGHT EYE, LEFT EYE, BOTH EYES: Status: ACTIVE | Noted: 2022-12-13

## 2022-12-19 PROBLEM — H16.221 DRY EYE SYNDROME K SICCA; RIGHT EYE, LEFT EYE, BOTH EYES: Status: ACTIVE | Noted: 2022-12-13

## 2022-12-19 PROCEDURE — 92012 INTRM OPH EXAM EST PATIENT: CPT | Performed by: OPHTHALMOLOGY

## 2022-12-19 ASSESSMENT — REFRACTION_MANIFEST
OS_VA1: 20/20
OD_SPHERE: PLANO
OD_ADD: +2.50
OS_CYLINDER: -0.75
OS_AXIS: 080
OD_VA1: 20/20
OS_ADD: +2.50
OD_AXIS: 090
OD_CYLINDER: -1.00
OS_SPHERE: -0.50

## 2022-12-19 ASSESSMENT — REFRACTION_AUTOREFRACTION
OS_CYLINDER: -0.75
OD_SPHERE: UTP
OS_AXIS: 075
OS_SPHERE: +0.25

## 2022-12-19 ASSESSMENT — VISUAL ACUITY
OS_BCVA: 20/20-
OD_BCVA: 20/25-1

## 2022-12-19 ASSESSMENT — SUPERFICIAL PUNCTATE KERATITIS (SPK)
OS_SPK: 1+
OD_SPK: 1+

## 2022-12-19 ASSESSMENT — REFRACTION_CURRENTRX
OS_ADD: +2.25
OS_SPHERE: -0.50
OD_SPHERE: PLANO
OS_VPRISM_DIRECTION: PROGS
OD_CYLINDER: -1.00
OD_AXIS: 093
OS_CYLINDER: -0.50
OS_OVR_VA: 20/
OD_ADD: +2.00
OS_AXIS: 081
OD_VPRISM_DIRECTION: PROGS
OD_OVR_VA: 20/

## 2022-12-19 ASSESSMENT — KERATOMETRY
OD_K2POWER_DIOPTERS: 41.50
OS_K2POWER_DIOPTERS: 41.50
OS_AXISANGLE_DEGREES: 117
OD_K1POWER_DIOPTERS: 40.75
OS_K1POWER_DIOPTERS: 41.25
OD_AXISANGLE_DEGREES: 054

## 2022-12-19 ASSESSMENT — CONFRONTATIONAL VISUAL FIELD TEST (CVF)
OS_FINDINGS: FULL
OD_FINDINGS: FULL

## 2022-12-19 ASSESSMENT — AXIALLENGTH_DERIVED
OS_AL: 24.4513
OS_AL: 24.7693

## 2022-12-19 ASSESSMENT — TONOMETRY
OD_IOP_MMHG: 19
OS_IOP_MMHG: 17

## 2022-12-19 ASSESSMENT — LID EXAM ASSESSMENTS
OD_BLEPHARITIS: RUL 1+ 2+
OS_BLEPHARITIS: LUL 1+ 2+

## 2022-12-19 ASSESSMENT — SPHEQUIV_DERIVED
OS_SPHEQUIV: -0.125
OS_SPHEQUIV: -0.875

## 2022-12-27 ENCOUNTER — APPOINTMENT (OUTPATIENT)
Dept: PULMONOLOGY | Facility: CLINIC | Age: 67
End: 2022-12-27

## 2022-12-27 VITALS
HEART RATE: 85 BPM | DIASTOLIC BLOOD PRESSURE: 68 MMHG | HEIGHT: 64 IN | OXYGEN SATURATION: 97 % | BODY MASS INDEX: 42.68 KG/M2 | SYSTOLIC BLOOD PRESSURE: 118 MMHG | WEIGHT: 250 LBS | RESPIRATION RATE: 16 BRPM

## 2022-12-27 PROCEDURE — 99214 OFFICE O/P EST MOD 30 MIN: CPT

## 2022-12-27 NOTE — HISTORY OF PRESENT ILLNESS
[Class I - No Symptoms and No Limitations] : I [Mild Persistent] : mild persistent [Good Control] : peak flow has been good [___ Times a Week] : of [unfilled] time(s) a week [Adherent] : the patient is adherent with ~his/her~ medication regimen [None] : The patient is currently asymptomatic [ESS] : 0 [Chest Pain] : no chest pain or discomfort [Cough at Night] : not awakened at night with cough [Wheezing at Night] : not awakened at night because of wheezing or trouble breathing [More Frequent Use Needed Recently] : normal [de-identified] : Rheumatoid arthrits  [FreeTextEntry3] : mild sinus HA, No sputum [de-identified] : MTX for RA

## 2022-12-27 NOTE — REASON FOR VISIT
[Follow-Up] : a follow-up visit [Asthma] : asthma [Bronchiectasis] : bronchiectasis [Pulmonary Nodules] : pulmonary nodules

## 2022-12-27 NOTE — CONSULT LETTER
[Dear  ___] : Dear  [unfilled], [Consult Letter:] : I had the pleasure of evaluating your patient, [unfilled]. [Please see my note below.] : Please see my note below. [Sincerely,] : Sincerely, [FreeTextEntry3] : [facility]\par

## 2022-12-27 NOTE — DISCUSSION/SUMMARY
[Asthma] : asthma [Stable] : stable [Responding to Treatment] : responding to treatment [PFTs] : pulmonary function tests [None] : There are no changes in medication management [Patient] : the patient

## 2023-06-13 ENCOUNTER — OFFICE (OUTPATIENT)
Dept: URBAN - METROPOLITAN AREA CLINIC 115 | Facility: CLINIC | Age: 68
Setting detail: OPHTHALMOLOGY
End: 2023-06-13
Payer: COMMERCIAL

## 2023-06-13 DIAGNOSIS — H01.004: ICD-10-CM

## 2023-06-13 DIAGNOSIS — H16.223: ICD-10-CM

## 2023-06-13 DIAGNOSIS — H01.001: ICD-10-CM

## 2023-06-13 DIAGNOSIS — H16.222: ICD-10-CM

## 2023-06-13 DIAGNOSIS — H16.221: ICD-10-CM

## 2023-06-13 DIAGNOSIS — H25.13: ICD-10-CM

## 2023-06-13 DIAGNOSIS — M32.9: ICD-10-CM

## 2023-06-13 DIAGNOSIS — Z79.891: ICD-10-CM

## 2023-06-13 PROCEDURE — 92134 CPTRZ OPH DX IMG PST SGM RTA: CPT | Performed by: OPHTHALMOLOGY

## 2023-06-13 PROCEDURE — 99214 OFFICE O/P EST MOD 30 MIN: CPT | Performed by: OPHTHALMOLOGY

## 2023-06-13 PROCEDURE — 83861 MICROFLUID ANALY TEARS: CPT | Performed by: OPHTHALMOLOGY

## 2023-06-13 ASSESSMENT — REFRACTION_AUTOREFRACTION
OD_AXIS: 100
OS_SPHERE: +0.25
OS_AXIS: 083
OD_SPHERE: +0.25
OS_CYLINDER: -1.25
OD_CYLINDER: -1.00

## 2023-06-13 ASSESSMENT — AXIALLENGTH_DERIVED
OD_AL: 24.6533
OS_AL: 24.6563
OS_AL: 24.8709

## 2023-06-13 ASSESSMENT — REFRACTION_MANIFEST
OD_ADD: +2.50
OD_AXIS: 090
OS_SPHERE: -0.50
OD_CYLINDER: -1.00
OD_SPHERE: PLANO
OS_AXIS: 080
OS_ADD: +2.50
OD_VA1: 20/20
OS_CYLINDER: -0.75
OS_VA1: 20/20

## 2023-06-13 ASSESSMENT — CONFRONTATIONAL VISUAL FIELD TEST (CVF)
OD_FINDINGS: FULL
OS_FINDINGS: FULL

## 2023-06-13 ASSESSMENT — REFRACTION_CURRENTRX
OD_SPHERE: PLANO
OS_CYLINDER: -0.50
OD_AXIS: 093
OD_ADD: +2.00
OS_AXIS: 081
OS_OVR_VA: 20/
OD_OVR_VA: 20/
OS_SPHERE: -0.50
OD_VPRISM_DIRECTION: PROGS
OS_ADD: +2.25
OS_VPRISM_DIRECTION: PROGS
OD_CYLINDER: -1.00

## 2023-06-13 ASSESSMENT — TONOMETRY
OD_IOP_MMHG: 19
OS_IOP_MMHG: 21

## 2023-06-13 ASSESSMENT — KERATOMETRY
OS_K1POWER_DIOPTERS: 41.00
OD_K1POWER_DIOPTERS: 40.50
OD_K2POWER_DIOPTERS: 41.50
OD_AXISANGLE_DEGREES: 011
OS_AXISANGLE_DEGREES: 141
OS_K2POWER_DIOPTERS: 41.25

## 2023-06-13 ASSESSMENT — VISUAL ACUITY
OD_BCVA: 20/20
OS_BCVA: 20/20-

## 2023-06-13 ASSESSMENT — SUPERFICIAL PUNCTATE KERATITIS (SPK)
OD_SPK: 1+
OS_SPK: 1+

## 2023-06-13 ASSESSMENT — SPHEQUIV_DERIVED
OS_SPHEQUIV: -0.375
OD_SPHEQUIV: -0.25
OS_SPHEQUIV: -0.875

## 2023-06-13 ASSESSMENT — LID EXAM ASSESSMENTS
OS_BLEPHARITIS: LUL 1+ 2+
OD_BLEPHARITIS: RUL 1+ 2+

## 2023-06-19 ENCOUNTER — RX CHANGE (OUTPATIENT)
Age: 68
End: 2023-06-19

## 2023-06-19 ENCOUNTER — APPOINTMENT (OUTPATIENT)
Dept: PULMONOLOGY | Facility: CLINIC | Age: 68
End: 2023-06-19
Payer: COMMERCIAL

## 2023-06-19 VITALS
HEIGHT: 64.5 IN | DIASTOLIC BLOOD PRESSURE: 77 MMHG | RESPIRATION RATE: 16 BRPM | HEART RATE: 60 BPM | BODY MASS INDEX: 39.3 KG/M2 | WEIGHT: 233 LBS | SYSTOLIC BLOOD PRESSURE: 113 MMHG | OXYGEN SATURATION: 97 %

## 2023-06-19 VITALS — WEIGHT: 233 LBS | HEIGHT: 64.5 IN | BODY MASS INDEX: 39.3 KG/M2

## 2023-06-19 PROCEDURE — 99214 OFFICE O/P EST MOD 30 MIN: CPT | Mod: 25

## 2023-06-19 PROCEDURE — 94010 BREATHING CAPACITY TEST: CPT

## 2023-06-19 RX ORDER — METHOTREXATE 25 MG/ML
INJECTION, SOLUTION INTRA-ARTERIAL; INTRAMUSCULAR; INTRATHECAL; INTRAVENOUS
Refills: 0 | Status: DISCONTINUED | COMMUNITY
End: 2023-06-19

## 2023-06-19 NOTE — DISCUSSION/SUMMARY
[Asthma] : asthma [Stable] : stable [PFTs] : pulmonary function tests [Patient] : the patient [Decompensated] : decompensated [Mild Persistent] : mild persistent [Medication Changes Per Orders] : Medication changes are as documented in orders

## 2023-06-19 NOTE — CONSULT LETTER
[Please see my note below.] : Please see my note below. [Sincerely,] : Sincerely, [Dear  ___] : Dear  [unfilled], [Courtesy Letter:] : I had the pleasure of seeing your patient, [unfilled], in my office today. [FreeTextEntry3] : Chalo Silverman MD FCCP\par Pulmonary/Critical Care/Sleep Medicine\par Department of Internal Medicine\par \par [facility]\par \par

## 2023-06-19 NOTE — HISTORY OF PRESENT ILLNESS
[Class I - No Symptoms and No Limitations] : I [Mild Persistent] : mild persistent [None] : The patient is currently asymptomatic [Good Control] : peak flow has been good [___ Times a Week] : of [unfilled] time(s) a week [Adherent] : the patient is adherent with ~his/her~ medication regimen [ESS] : 0 [Chest Pain] : no chest pain or discomfort [Cough at Night] : not awakened at night with cough [Wheezing at Night] : not awakened at night because of wheezing or trouble breathing [More Frequent Use Needed Recently] : normal [de-identified] : Rheumatoid arthrits  [de-identified] : off MTX for RA [FreeTextEntry3] : mild sinus HA, No sputum

## 2023-06-20 ENCOUNTER — RX CHANGE (OUTPATIENT)
Age: 68
End: 2023-06-20

## 2023-06-20 RX ORDER — ALBUTEROL SULFATE 90 UG/1
108 (90 BASE) POWDER, METERED RESPIRATORY (INHALATION)
Qty: 3 | Refills: 3 | Status: DISCONTINUED | COMMUNITY
Start: 2017-03-21 | End: 2023-06-20

## 2023-06-27 ENCOUNTER — TRANSCRIPTION ENCOUNTER (OUTPATIENT)
Age: 68
End: 2023-06-27

## 2023-06-27 RX ORDER — FLUTICASONE FUROATE 200 UG/1
200 POWDER RESPIRATORY (INHALATION) DAILY
Qty: 1 | Refills: 5 | Status: DISCONTINUED | COMMUNITY
Start: 2023-06-19 | End: 2023-06-27

## 2023-07-05 RX ORDER — ALBUTEROL SULFATE 90 UG/1
108 (90 BASE) INHALANT RESPIRATORY (INHALATION)
Qty: 3 | Refills: 3 | Status: DISCONTINUED | COMMUNITY
Start: 2023-06-20 | End: 2023-07-05

## 2023-07-06 ENCOUNTER — TRANSCRIPTION ENCOUNTER (OUTPATIENT)
Age: 68
End: 2023-07-06

## 2023-09-21 ENCOUNTER — APPOINTMENT (OUTPATIENT)
Dept: PULMONOLOGY | Facility: CLINIC | Age: 68
End: 2023-09-21
Payer: COMMERCIAL

## 2023-09-21 VITALS — BODY MASS INDEX: 39.65 KG/M2 | HEIGHT: 65 IN | WEIGHT: 238 LBS

## 2023-09-21 VITALS
RESPIRATION RATE: 16 BRPM | SYSTOLIC BLOOD PRESSURE: 132 MMHG | OXYGEN SATURATION: 98 % | HEART RATE: 69 BPM | DIASTOLIC BLOOD PRESSURE: 78 MMHG

## 2023-09-21 DIAGNOSIS — E32.0 PERSISTENT HYPERPLASIA OF THYMUS: ICD-10-CM

## 2023-09-21 DIAGNOSIS — M05.79 RHEUMATOID ARTHRITIS WITH RHEUMATOID FACTOR OF MULTIPLE SITES W/OUT ORGAN OR SYSTEMS INVOLVEMENT: ICD-10-CM

## 2023-09-21 DIAGNOSIS — J45.30 MILD PERSISTENT ASTHMA, UNCOMPLICATED: ICD-10-CM

## 2023-09-21 DIAGNOSIS — J32.9 CHRONIC SINUSITIS, UNSPECIFIED: ICD-10-CM

## 2023-09-21 PROCEDURE — 94010 BREATHING CAPACITY TEST: CPT

## 2023-09-21 PROCEDURE — 99214 OFFICE O/P EST MOD 30 MIN: CPT | Mod: 25

## 2023-12-12 ENCOUNTER — OFFICE (OUTPATIENT)
Dept: URBAN - METROPOLITAN AREA CLINIC 115 | Facility: CLINIC | Age: 68
Setting detail: OPHTHALMOLOGY
End: 2023-12-12
Payer: COMMERCIAL

## 2023-12-12 DIAGNOSIS — H16.223: ICD-10-CM

## 2023-12-12 DIAGNOSIS — H43.812: ICD-10-CM

## 2023-12-12 DIAGNOSIS — Z79.891: ICD-10-CM

## 2023-12-12 DIAGNOSIS — M32.9: ICD-10-CM

## 2023-12-12 DIAGNOSIS — H25.13: ICD-10-CM

## 2023-12-12 PROCEDURE — 92250 FUNDUS PHOTOGRAPHY W/I&R: CPT | Performed by: OPHTHALMOLOGY

## 2023-12-12 PROCEDURE — 92083 EXTENDED VISUAL FIELD XM: CPT | Performed by: OPHTHALMOLOGY

## 2023-12-12 PROCEDURE — 99214 OFFICE O/P EST MOD 30 MIN: CPT | Performed by: OPHTHALMOLOGY

## 2023-12-12 ASSESSMENT — REFRACTION_CURRENTRX
OS_AXIS: 081
OD_SPHERE: PLANO
OD_CYLINDER: -1.00
OD_AXIS: 093
OD_OVR_VA: 20/
OS_CYLINDER: -0.50
OS_VPRISM_DIRECTION: PROGS
OD_ADD: +2.00
OS_SPHERE: -0.50
OD_VPRISM_DIRECTION: PROGS
OS_OVR_VA: 20/
OS_ADD: +2.25

## 2023-12-12 ASSESSMENT — REFRACTION_MANIFEST
OS_VA1: 20/20
OS_CYLINDER: -0.75
OD_ADD: +2.50
OD_VA1: 20/20
OD_CYLINDER: -1.00
OD_SPHERE: PLANO
OS_AXIS: 080
OS_ADD: +2.50
OD_AXIS: 090
OS_SPHERE: -0.50

## 2023-12-12 ASSESSMENT — LID EXAM ASSESSMENTS
OD_BLEPHARITIS: RUL 1+ 2+
OS_BLEPHARITIS: LUL 1+ 2+

## 2023-12-12 ASSESSMENT — SUPERFICIAL PUNCTATE KERATITIS (SPK)
OS_SPK: 1+
OD_SPK: 1+

## 2023-12-12 ASSESSMENT — CONFRONTATIONAL VISUAL FIELD TEST (CVF)
OD_FINDINGS: FULL
OS_FINDINGS: FULL

## 2023-12-12 ASSESSMENT — SPHEQUIV_DERIVED
OD_SPHEQUIV: -0.25
OS_SPHEQUIV: -0.875
OS_SPHEQUIV: -0.375

## 2023-12-12 ASSESSMENT — REFRACTION_AUTOREFRACTION
OD_AXIS: 100
OD_SPHERE: +0.25
OD_CYLINDER: -1.00
OS_SPHERE: +0.25
OS_CYLINDER: -1.25
OS_AXIS: 083

## 2024-01-02 RX ORDER — FLUTICASONE PROPIONATE 110 UG/1
110 AEROSOL, METERED RESPIRATORY (INHALATION) TWICE DAILY
Qty: 3 | Refills: 3 | Status: DISCONTINUED | COMMUNITY
Start: 2023-06-27 | End: 2024-01-02

## 2024-01-16 RX ORDER — BUDESONIDE 180 UG/1
180 AEROSOL, POWDER RESPIRATORY (INHALATION)
Qty: 3 | Refills: 3 | Status: ACTIVE | COMMUNITY
Start: 2024-01-16 | End: 1900-01-01

## 2024-01-16 RX ORDER — BECLOMETHASONE DIPROPIONATE HFA 80 UG/1
80 AEROSOL, METERED RESPIRATORY (INHALATION) TWICE DAILY
Qty: 3 | Refills: 0 | Status: DISCONTINUED | COMMUNITY
Start: 2024-01-02 | End: 2024-01-16

## 2024-03-25 ENCOUNTER — APPOINTMENT (OUTPATIENT)
Dept: PULMONOLOGY | Facility: CLINIC | Age: 69
End: 2024-03-25

## 2024-04-02 ENCOUNTER — RX CHANGE (OUTPATIENT)
Age: 69
End: 2024-04-02

## 2024-04-02 RX ORDER — LEVALBUTEROL TARTRATE 45 UG/1
45 AEROSOL, METERED ORAL
Qty: 3 | Refills: 3 | Status: DISCONTINUED | COMMUNITY
Start: 2023-07-05 | End: 2024-04-02

## 2024-04-03 RX ORDER — ALBUTEROL SULFATE 90 UG/1
108 (90 BASE) INHALANT RESPIRATORY (INHALATION)
Qty: 1 | Refills: 2 | Status: ACTIVE | COMMUNITY
Start: 1900-01-01 | End: 1900-01-01

## 2024-07-05 ENCOUNTER — OFFICE (OUTPATIENT)
Dept: URBAN - METROPOLITAN AREA CLINIC 115 | Facility: CLINIC | Age: 69
Setting detail: OPHTHALMOLOGY
End: 2024-07-05
Payer: MEDICARE

## 2024-07-05 DIAGNOSIS — H16.221: ICD-10-CM

## 2024-07-05 DIAGNOSIS — M32.9: ICD-10-CM

## 2024-07-05 DIAGNOSIS — H16.223: ICD-10-CM

## 2024-07-05 DIAGNOSIS — H43.812: ICD-10-CM

## 2024-07-05 DIAGNOSIS — Z79.891: ICD-10-CM

## 2024-07-05 DIAGNOSIS — H16.222: ICD-10-CM

## 2024-07-05 DIAGNOSIS — H25.13: ICD-10-CM

## 2024-07-05 PROCEDURE — 92134 CPTRZ OPH DX IMG PST SGM RTA: CPT | Performed by: OPHTHALMOLOGY

## 2024-07-05 PROCEDURE — 83861 MICROFLUID ANALY TEARS: CPT | Mod: QW,LT | Performed by: OPHTHALMOLOGY

## 2024-07-05 PROCEDURE — 83861 MICROFLUID ANALY TEARS: CPT | Mod: QW,RT | Performed by: OPHTHALMOLOGY

## 2024-07-05 PROCEDURE — 99214 OFFICE O/P EST MOD 30 MIN: CPT | Performed by: OPHTHALMOLOGY

## 2024-07-05 ASSESSMENT — CONFRONTATIONAL VISUAL FIELD TEST (CVF)
OS_FINDINGS: FULL
OD_FINDINGS: FULL

## 2024-07-05 ASSESSMENT — LID EXAM ASSESSMENTS
OD_BLEPHARITIS: RUL 1+ 2+
OS_BLEPHARITIS: LUL 1+ 2+

## 2024-08-08 ENCOUNTER — APPOINTMENT (OUTPATIENT)
Dept: PULMONOLOGY | Facility: CLINIC | Age: 69
End: 2024-08-08

## 2024-08-08 PROCEDURE — G2211 COMPLEX E/M VISIT ADD ON: CPT

## 2024-08-08 PROCEDURE — 99204 OFFICE O/P NEW MOD 45 MIN: CPT

## 2024-08-08 NOTE — HISTORY OF PRESENT ILLNESS
[Class I - No Symptoms and No Limitations] : I [Mild Persistent] : mild persistent [None] : The patient is currently asymptomatic [Good Control] : peak flow has been good [___ Times a Week] : of [unfilled] time(s) a week [Adherent] : the patient is adherent with ~his/her~ medication regimen [ESS] : 0 [Chest Pain] : no chest pain or discomfort [Cough at Night] : not awakened at night with cough [Wheezing at Night] : not awakened at night because of wheezing or trouble breathing [More Frequent Use Needed Recently] : normal [de-identified] : Rheumatoid arthrits,sinusitis [de-identified] : Postnasal drip [FreeTextEntry3] : mild sinus HA, No sputum

## 2024-08-08 NOTE — DISCUSSION/SUMMARY
[Asthma] : asthma [Improving] : improving [Responding to Treatment] : responding to treatment [Mild Persistent] : mild persistent [PFTs] : pulmonary function tests [None] : There are no changes in medication management [Patient] : the patient [de-identified] : Nasal saline rinse for sinusitis

## 2024-08-08 NOTE — CONSULT LETTER
[Courtesy Letter:] : I had the pleasure of seeing your patient, [unfilled], in my office today. [Please see my note below.] : Please see my note below. [Sincerely,] : Sincerely, [Dear  ___] : Dear  [unfilled], [FreeTextEntry3] : Chalo Silverman MD FCCP\par  Pulmonary/Critical Care/Sleep Medicine\par  Department of Internal Medicine\par  \par  [facility]\par  \par

## 2024-08-08 NOTE — RESULTS/DATA
[TextEntry] : 5/10/2022: [facility] noncontrast CAT scan of the chest-stable sub-3 mm nodules in the lingula, stable nodularity in the anterior mediastinum thymic bed with a discrete 1.2 cm nodule.  (Films reviewed on PACS) films reviewed to prior chest MR from 11/2020 and chest CT of 11/2020 11/11/20: Chest CT without contrast. [facility] imaging-residual thymus with a 1.4 cm isodense structure in the anterior mediastinum. Slight increase in size when compared to previous studies.  12./3/20: Chest MRI with contrast. [facility]-1.2 cm anterior mediastinal lesion demonstrates fluid signal intensity without enhancement. Benign.  (Films reviewed with patient on PACS)

## 2024-08-08 NOTE — REASON FOR VISIT
[Follow-Up] : a follow-up visit [Asthma] : asthma [Bronchiectasis] : bronchiectasis [Pulmonary Nodules] : pulmonary nodules [TextBox_44] : Mediastinal Mass

## 2025-01-10 ENCOUNTER — OFFICE (OUTPATIENT)
Dept: URBAN - METROPOLITAN AREA CLINIC 115 | Facility: CLINIC | Age: 70
Setting detail: OPHTHALMOLOGY
End: 2025-01-10
Payer: MEDICARE

## 2025-01-10 DIAGNOSIS — M32.9: ICD-10-CM

## 2025-01-10 DIAGNOSIS — H16.222: ICD-10-CM

## 2025-01-10 DIAGNOSIS — H43.812: ICD-10-CM

## 2025-01-10 DIAGNOSIS — H16.221: ICD-10-CM

## 2025-01-10 DIAGNOSIS — H16.223: ICD-10-CM

## 2025-01-10 DIAGNOSIS — H01.001: ICD-10-CM

## 2025-01-10 DIAGNOSIS — H25.13: ICD-10-CM

## 2025-01-10 DIAGNOSIS — H01.004: ICD-10-CM

## 2025-01-10 DIAGNOSIS — Z79.891: ICD-10-CM

## 2025-01-10 PROCEDURE — 92250 FUNDUS PHOTOGRAPHY W/I&R: CPT | Performed by: OPHTHALMOLOGY

## 2025-01-10 PROCEDURE — 92014 COMPRE OPH EXAM EST PT 1/>: CPT | Performed by: OPHTHALMOLOGY

## 2025-01-10 PROCEDURE — 92083 EXTENDED VISUAL FIELD XM: CPT | Performed by: OPHTHALMOLOGY

## 2025-01-10 ASSESSMENT — REFRACTION_AUTOREFRACTION
OS_AXIS: 081
OD_CYLINDER: -1.00
OS_SPHERE: -0.25
OD_SPHERE: +0.25
OS_CYLINDER: -1.00
OD_AXIS: 101

## 2025-01-10 ASSESSMENT — REFRACTION_MANIFEST
OS_ADD: +2.00
OS_SPHERE: -0.50
OD_ADD: +2.00
OS_CYLINDER: -0.75
OD_AXIS: 090
OS_AXIS: 080
OD_SPHERE: PLANO
OS_VA1: 20/20
OD_CYLINDER: -1.00
OD_VA1: 20/20

## 2025-01-10 ASSESSMENT — REFRACTION_CURRENTRX
OS_AXIS: 081
OD_ADD: +2.00
OS_OVR_VA: 20/
OD_SPHERE: PLANO
OD_VPRISM_DIRECTION: PROGS
OS_VPRISM_DIRECTION: PROGS
OS_ADD: +2.25
OS_SPHERE: -0.50
OD_CYLINDER: -1.00
OD_OVR_VA: 20/
OD_AXIS: 093
OS_CYLINDER: -0.50

## 2025-01-10 ASSESSMENT — KERATOMETRY
OD_AXISANGLE_DEGREES: 011
OD_K2POWER_DIOPTERS: 41.50
OD_K1POWER_DIOPTERS: 40.50
OS_AXISANGLE_DEGREES: 141
OS_K2POWER_DIOPTERS: 41.25
OS_K1POWER_DIOPTERS: 41.00

## 2025-01-10 ASSESSMENT — VISUAL ACUITY
OS_BCVA: 20/20-
OD_BCVA: 20/20

## 2025-01-10 ASSESSMENT — TONOMETRY
OD_IOP_MMHG: 16
OS_IOP_MMHG: 15

## 2025-01-10 ASSESSMENT — LID EXAM ASSESSMENTS
OS_BLEPHARITIS: LUL 1+ 2+
OD_BLEPHARITIS: RUL 1+ 2+

## 2025-01-10 ASSESSMENT — SUPERFICIAL PUNCTATE KERATITIS (SPK)
OS_SPK: 1+
OD_SPK: 1+

## 2025-01-10 ASSESSMENT — CONFRONTATIONAL VISUAL FIELD TEST (CVF)
OS_FINDINGS: FULL
OD_FINDINGS: FULL

## 2025-04-14 ASSESSMENT — REFRACTION_CURRENTRX
OD_CYLINDER: -1.00
OD_VPRISM_DIRECTION: PROGS
OS_CYLINDER: -0.50
OS_OVR_VA: 20/
OD_ADD: +2.00
OS_ADD: +2.25
OS_AXIS: 081
OD_OVR_VA: 20/
OD_SPHERE: PLANO
OS_VPRISM_DIRECTION: PROGS
OS_SPHERE: -0.50
OD_AXIS: 093

## 2025-04-14 ASSESSMENT — KERATOMETRY
OS_K1POWER_DIOPTERS: 41.00
OD_AXISANGLE_DEGREES: 011
OS_AXISANGLE_DEGREES: 141
OD_K1POWER_DIOPTERS: 40.50
OD_K2POWER_DIOPTERS: 41.50
OS_K2POWER_DIOPTERS: 41.25

## 2025-04-14 ASSESSMENT — REFRACTION_MANIFEST
OD_ADD: +2.00
OD_CYLINDER: -1.00
OD_AXIS: 090
OS_ADD: +2.00
OS_CYLINDER: -0.75
OS_VA1: 20/20
OD_SPHERE: PLANO
OD_VA1: 20/20
OS_AXIS: 080
OS_SPHERE: -0.50

## 2025-05-27 ENCOUNTER — EMERGENCY (EMERGENCY)
Facility: HOSPITAL | Age: 70
LOS: 1 days | End: 2025-05-27
Attending: EMERGENCY MEDICINE
Payer: MEDICARE

## 2025-05-27 VITALS
TEMPERATURE: 98 F | HEIGHT: 66 IN | DIASTOLIC BLOOD PRESSURE: 89 MMHG | WEIGHT: 243.39 LBS | OXYGEN SATURATION: 97 % | RESPIRATION RATE: 20 BRPM | HEART RATE: 65 BPM | SYSTOLIC BLOOD PRESSURE: 151 MMHG

## 2025-05-27 PROCEDURE — 96372 THER/PROPH/DIAG INJ SC/IM: CPT

## 2025-05-27 PROCEDURE — 70450 CT HEAD/BRAIN W/O DYE: CPT | Mod: 26

## 2025-05-27 PROCEDURE — 99284 EMERGENCY DEPT VISIT MOD MDM: CPT | Mod: 25

## 2025-05-27 PROCEDURE — 70450 CT HEAD/BRAIN W/O DYE: CPT

## 2025-05-27 PROCEDURE — 99284 EMERGENCY DEPT VISIT MOD MDM: CPT | Mod: FS

## 2025-05-27 RX ORDER — METOCLOPRAMIDE HCL 10 MG
10 TABLET ORAL ONCE
Refills: 0 | Status: COMPLETED | OUTPATIENT
Start: 2025-05-27 | End: 2025-05-27

## 2025-05-27 RX ORDER — ACETAMINOPHEN 500 MG/5ML
975 LIQUID (ML) ORAL ONCE
Refills: 0 | Status: COMPLETED | OUTPATIENT
Start: 2025-05-27 | End: 2025-05-27

## 2025-05-27 RX ADMIN — Medication 975 MILLIGRAM(S): at 15:32

## 2025-05-27 RX ADMIN — Medication 10 MILLIGRAM(S): at 15:32

## 2025-05-27 NOTE — ED PROVIDER NOTE - ATTENDING APP SHARED VISIT CONTRIBUTION OF CARE
pt hit her head on a metal bar at the playground when she went to stand up  no loc nv visual changes  has intermittent ha , dizzy so came to ER  does get temporary relief w ibuprofen  no other neuro symptoms  pe no neuro deficits  plan meds for comfrot and imaging  agree w merle and mey

## 2025-05-27 NOTE — ED PROVIDER NOTE - CARE PROVIDERS DIRECT ADDRESSES
,[email or web address]

## 2025-05-27 NOTE — ED PROVIDER NOTE - PHYSICAL EXAMINATION
Const: AOX3 nontoxic appearing, no apparent respiratory or physical distress. Stable gait   HEENT: NC/AT no raccoon eyes no honeycutt signs  Eyes: KENTRELL. EOMI  Neck: Soft and supple. Full ROM without pain.  Cardiac: Regular rate and regular rhythm. +S1/S2.   Resp: Speaking in full sentences. No evidence of respiratory distress.   Neuro: Awake, alert & oriented x 3. Moves all extremities symmetrically.

## 2025-05-27 NOTE — ED PROVIDER NOTE - CLINICAL SUMMARY MEDICAL DECISION MAKING FREE TEXT BOX
70 y.o female Past medical history of the hypertension hypothyroids,  arthritis, lupus presents emergency room status post  with patient  incidentally  about 9 days ago hit the top of her head to the metal bar at the playground without nausea vomiting or loss of consciousness. she has intermittent headache that get worse since 2 days ago..  She took a Profen today around 12 PM that helped some with the headache still have,  she called"  noting headache" especially on top of the head at the area of the head. denies any visual changes difficulty walking or talking or weakness in the arms or legs -no neck pain     likely concussion patient wants to make sure we will obtain a CT of the head. Tylenol and Reglan

## 2025-05-27 NOTE — ED PROVIDER NOTE - NSFOLLOWUPINSTRUCTIONS_ED_ALL_ED_FT
continue Tylenol alternative ibuprofen over-the-counter as needed for the headache  For follow-up with primary care doctor and bring the result  Head Injury, Adult       There are many types of head injuries. They can be as minor as a small bump. Some head injuries can be worse. Worse injuries include:    A strong hit to the head that shakes the brain back and forth, causing damage (concussion).  A bruise (contusion) of the brain. This means there is bleeding in the brain that can cause swelling.  A cracked skull (skull fracture).  Bleeding in the brain that gathers, gets thick (makes a clot), and forms a bump (hematoma).    Most problems from a head injury come in the first 24 hours. However, you may still have side effects up to 7–10 days after your injury. It is important to watch your condition for any changes. You may need to be watched in the emergency department or urgent care, or you may need to stay in the hospital.    What are the causes?  There are many possible causes of a head injury. A serious head injury may be caused by:    A car accident.  Bicycle or motorcycle accidents.  Sports injuries.  Falls.  Being hit by an object.    What are the signs or symptoms?  Symptoms of a head injury include a bruise, bump, or bleeding where the injury happened. Other physical symptoms may include:    Headache.  Feeling like you may vomit (nauseous) or vomiting.  Dizziness.  Blurred or double vision.  Being uncomfortable around bright lights or loud noises.  Shaking movements that you cannot control (seizures).  Feeling tired.  Trouble being woken up.  Fainting or loss of consciousness.    Mental or emotional symptoms may include:    Feeling grumpy or cranky.  Confusion and memory problems.  Having trouble paying attention or concentrating.  Changes in eating or sleeping habits.  Feeling worried or nervous (anxious).  Feeling sad (depressed).    How is this treated?  Treatment for this condition depends on how severe the injury is and the type of injury you have. The main goal is to prevent problems and to allow the brain time to heal.        Mild head injury    If you have a mild head injury, you may be sent home, and treatment may include:    Being watched. A responsible adult should stay with you for 24 hours after your injury and check on you often.  Physical rest.  Brain rest.  Pain medicines.        Severe head injury    If you have a severe head injury, treatment may include:    Being watched closely. This includes staying in the hospital.  Medicines to:    Help with pain.  Prevent seizures.  Help with brain swelling.  Protecting your airway and using a machine that helps you breathe (ventilator).  Treatments to watch for and manage swelling inside the brain.  Brain surgery. This may be needed to:    Remove a collection of blood or blood clots.  Stop the bleeding.  Remove a part of the skull. This allows room for the brain to swell.    Follow these instructions at home:      Activity    Rest.  Avoid activities that are hard or tiring.  Make sure you get enough sleep.  Let your brain rest. Do this by limiting activities that need a lot of thought or attention, such as:    Watching TV.  Playing memory games and puzzles.  Job-related work or homework.  Working on the computer, social media, and texting.  Avoid activities that could cause another head injury until your doctor says it is okay. This includes playing sports. Having another head injury, especially before the first one has healed, can be dangerous.  Ask your doctor when it is safe for you to go back to your normal activities, such as work or school. Ask your doctor for a step-by-step plan for slowly going back to your normal activities.  Ask your doctor when you can drive, ride a bicycle, or use heavy machinery. Do not do these activities if you are dizzy.        Lifestyle     Do not drink alcohol until your doctor says it is okay.  Do not use drugs.  If it is harder than usual to remember things, write them down.  If you are easily distracted, try to do one thing at a time.  Talk with family members or close friends when making important decisions.  Tell your friends, family, a trusted co-worker, and  about your injury, symptoms, and limits (restrictions). Have them watch for any problems that are new or getting worse.        General instructions    Take over-the-counter and prescription medicines only as told by your doctor.  Have someone stay with you for 24 hours after your head injury. This person should watch you for any changes in your symptoms and be ready to get help.  Keep all follow-up visits as told by your doctor. This is important.        How is this prevented?    Work on your balance and strength. This can help you avoid falls.  Wear a seat belt when you are in a moving vehicle.  Wear a helmet when you:    Ride a bicycle.  Ski.  Do any other sport or activity that has a risk of injury.  If you drink alcohol:    Limit how much you use to:    0–1 drink a day for nonpregnant women.  0–2 drinks a day for men.  Be aware of how much alcohol is in your drink. In the [place] one drink equals one 12 oz bottle of beer (355 mL), one 5 oz glass of wine (148 mL), or one 1½ oz glass of hard liquor (44 mL).  Make your home safer by:    Getting rid of clutter from the floors and stairs. This includes things that can make you trip.  Using grab bars in bathrooms and handrails by stairs.  Placing non-slip mats on floors and in bathtubs.  Putting more light in dim areas.    Where to find more information  Centers for Disease Control and Prevention: www.cdc.gov    Get help right away if:  You have:    A very bad headache that is not helped by medicine.  Trouble walking or weakness in your arms and legs.  Clear or bloody fluid coming from your nose or ears.  Changes in how you see (vision).  A seizure.  More confusion or more grumpy moods.  Your symptoms get worse.  You are sleepier than normal and have trouble staying awake.  You lose your balance.  The black centers of your eyes (pupils) change in size.  Your speech is slurred.  Your dizziness gets worse.  You vomit.    These symptoms may be an emergency. Do not wait to see if the symptoms will go away. Get medical help right away. Call your local emergency services (911 in the [place] Do not drive yourself to the hospital.    Summary  Head injuries can be as minor as a small bump. Some head injuries can be worse.  Treatment for this condition depends on how severe the injury is and the type of injury you have.  Have someone stay with you for 24 hours after your head injury.  Ask your doctor when it is safe for you to go back to your normal activities, such as work or school.  To prevent a head injury, wear a seat belt in a car, wear a helmet when you use a bicycle, limit your alcohol use, and make your home safer.    ADDITIONAL NOTES AND INSTRUCTIONS    Please follow up with your Primary MD in 24-48 hr.  Seek immediate medical care for any new/worsening signs or symptoms. continue Tylenol alternative ibuprofen over-the-counter as needed for the headache  For follow-up with primary care doctor and bring the result for further evaluation for other finding include partial empty sella and mild sinusitis, continue Flonase for sinusitis  Head Injury, Adult       There are many types of head injuries. They can be as minor as a small bump. Some head injuries can be worse. Worse injuries include:    A strong hit to the head that shakes the brain back and forth, causing damage (concussion).  A bruise (contusion) of the brain. This means there is bleeding in the brain that can cause swelling.  A cracked skull (skull fracture).  Bleeding in the brain that gathers, gets thick (makes a clot), and forms a bump (hematoma).    Most problems from a head injury come in the first 24 hours. However, you may still have side effects up to 7–10 days after your injury. It is important to watch your condition for any changes. You may need to be watched in the emergency department or urgent care, or you may need to stay in the hospital.    What are the causes?  There are many possible causes of a head injury. A serious head injury may be caused by:    A car accident.  Bicycle or motorcycle accidents.  Sports injuries.  Falls.  Being hit by an object.    What are the signs or symptoms?  Symptoms of a head injury include a bruise, bump, or bleeding where the injury happened. Other physical symptoms may include:    Headache.  Feeling like you may vomit (nauseous) or vomiting.  Dizziness.  Blurred or double vision.  Being uncomfortable around bright lights or loud noises.  Shaking movements that you cannot control (seizures).  Feeling tired.  Trouble being woken up.  Fainting or loss of consciousness.    Mental or emotional symptoms may include:    Feeling grumpy or cranky.  Confusion and memory problems.  Having trouble paying attention or concentrating.  Changes in eating or sleeping habits.  Feeling worried or nervous (anxious).  Feeling sad (depressed).    How is this treated?  Treatment for this condition depends on how severe the injury is and the type of injury you have. The main goal is to prevent problems and to allow the brain time to heal.        Mild head injury    If you have a mild head injury, you may be sent home, and treatment may include:    Being watched. A responsible adult should stay with you for 24 hours after your injury and check on you often.  Physical rest.  Brain rest.  Pain medicines.        Severe head injury    If you have a severe head injury, treatment may include:    Being watched closely. This includes staying in the hospital.  Medicines to:    Help with pain.  Prevent seizures.  Help with brain swelling.  Protecting your airway and using a machine that helps you breathe (ventilator).  Treatments to watch for and manage swelling inside the brain.  Brain surgery. This may be needed to:    Remove a collection of blood or blood clots.  Stop the bleeding.  Remove a part of the skull. This allows room for the brain to swell.    Follow these instructions at home:      Activity    Rest.  Avoid activities that are hard or tiring.  Make sure you get enough sleep.  Let your brain rest. Do this by limiting activities that need a lot of thought or attention, such as:    Watching TV.  Playing memory games and puzzles.  Job-related work or homework.  Working on the computer, social media, and texting.  Avoid activities that could cause another head injury until your doctor says it is okay. This includes playing sports. Having another head injury, especially before the first one has healed, can be dangerous.  Ask your doctor when it is safe for you to go back to your normal activities, such as work or school. Ask your doctor for a step-by-step plan for slowly going back to your normal activities.  Ask your doctor when you can drive, ride a bicycle, or use heavy machinery. Do not do these activities if you are dizzy.        Lifestyle     Do not drink alcohol until your doctor says it is okay.  Do not use drugs.  If it is harder than usual to remember things, write them down.  If you are easily distracted, try to do one thing at a time.  Talk with family members or close friends when making important decisions.  Tell your friends, family, a trusted co-worker, and  about your injury, symptoms, and limits (restrictions). Have them watch for any problems that are new or getting worse.        General instructions    Take over-the-counter and prescription medicines only as told by your doctor.  Have someone stay with you for 24 hours after your head injury. This person should watch you for any changes in your symptoms and be ready to get help.  Keep all follow-up visits as told by your doctor. This is important.        How is this prevented?    Work on your balance and strength. This can help you avoid falls.  Wear a seat belt when you are in a moving vehicle.  Wear a helmet when you:    Ride a bicycle.  Ski.  Do any other sport or activity that has a risk of injury.  If you drink alcohol:    Limit how much you use to:    0–1 drink a day for nonpregnant women.  0–2 drinks a day for men.  Be aware of how much alcohol is in your drink. In the [place] one drink equals one 12 oz bottle of beer (355 mL), one 5 oz glass of wine (148 mL), or one 1½ oz glass of hard liquor (44 mL).  Make your home safer by:    Getting rid of clutter from the floors and stairs. This includes things that can make you trip.  Using grab bars in bathrooms and handrails by stairs.  Placing non-slip mats on floors and in bathtubs.  Putting more light in dim areas.    Where to find more information  Centers for Disease Control and Prevention: www.cdc.gov    Get help right away if:  You have:    A very bad headache that is not helped by medicine.  Trouble walking or weakness in your arms and legs.  Clear or bloody fluid coming from your nose or ears.  Changes in how you see (vision).  A seizure.  More confusion or more grumpy moods.  Your symptoms get worse.  You are sleepier than normal and have trouble staying awake.  You lose your balance.  The black centers of your eyes (pupils) change in size.  Your speech is slurred.  Your dizziness gets worse.  You vomit.    These symptoms may be an emergency. Do not wait to see if the symptoms will go away. Get medical help right away. Call your local emergency services (911 in the [place] Do not drive yourself to the hospital.    Summary  Head injuries can be as minor as a small bump. Some head injuries can be worse.  Treatment for this condition depends on how severe the injury is and the type of injury you have.  Have someone stay with you for 24 hours after your head injury.  Ask your doctor when it is safe for you to go back to your normal activities, such as work or school.  To prevent a head injury, wear a seat belt in a car, wear a helmet when you use a bicycle, limit your alcohol use, and make your home safer.    ADDITIONAL NOTES AND INSTRUCTIONS    Please follow up with your Primary MD in 24-48 hr.  Seek immediate medical care for any new/worsening signs or symptoms.

## 2025-05-27 NOTE — ED PROVIDER NOTE - PATIENT PORTAL LINK FT
You can access the FollowMyHealth Patient Portal offered by [facility] by registering at the following website: [email or web address]. By joining [facility]’s FollowMyHealth portal, you will also be able to view your health information using other applications (apps) compatible with our system.

## 2025-05-27 NOTE — ED PROVIDER NOTE - OBJECTIVE STATEMENT
70 y.o female Past medical history of the hypertension hypothyroids,  arthritis, lupus presents emergency room status post  with patient  incidentally  about 9 days ago hit the head to the metal bar at the playground without nausea vomiting or loss of consciousness. she has intermittent headache that get worse since 2 days ago..  She took a Profen today around 12 PM that helped some with the headache still have,  she called"  noting headache" especially on top of the head at the area of the head. denies any visual changes difficulty walking or talking or weakness in the arms or legs -no neck pain

## 2025-05-27 NOTE — ED ADULT TRIAGE NOTE - CHIEF COMPLAINT QUOTE
pt arrives to ED c/o head pain after hitting her head on a bar at a playground 1 week ago, denies N/V/D/CP/SOB/cardiac history, Motrin for pain

## 2025-05-27 NOTE — ED ADULT TRIAGE NOTE - IDEAL BODY WEIGHT(KG)
59 Tazorac Pregnancy And Lactation Text: This medication is not safe during pregnancy. It is unknown if this medication is excreted in breast milk.

## 2025-05-27 NOTE — ED PROVIDER NOTE - NSICDXPASTMEDICALHX_GEN_ALL_CORE_FT
PAST MEDICAL HISTORY:  GERD (gastroesophageal reflux disease)     HTN (hypertension)     Hypothyroidism     Lupus

## 2025-05-27 NOTE — ED PROVIDER NOTE - PROVIDER TOKENS
PROVIDER:[TOKEN:[[id number]],FOLLOWUP:[7-10 Days]],PROVIDER:[TOKEN:[[id number]],FOLLOWUP:[4-6 Days]]

## 2025-05-27 NOTE — ED PROVIDER NOTE - CARE PROVIDER_API CALL
Marisol Pak  Neurosurgery  [address] [place] [id number]  Phone: [phone]  Fax: [phone]  Follow Up Time: 7-10 Days    Paradise García  Physical/Rehab Medicine  [address] [place] [id number]  Phone: [phone]  Fax: [phone]  Follow Up Time: 4-6 Days

## 2025-05-31 DIAGNOSIS — L93.2 OTHER LOCAL LUPUS ERYTHEMATOSUS: ICD-10-CM

## 2025-05-31 DIAGNOSIS — I10 ESSENTIAL (PRIMARY) HYPERTENSION: ICD-10-CM

## 2025-05-31 DIAGNOSIS — R51.9 HEADACHE, UNSPECIFIED: ICD-10-CM
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
## 2025-05-31 DIAGNOSIS — E03.9 HYPOTHYROIDISM, UNSPECIFIED: ICD-10-CM

## 2025-05-31 DIAGNOSIS — S09.90XA UNSPECIFIED INJURY OF HEAD, INITIAL ENCOUNTER: ICD-10-CM

## 2025-07-16 ENCOUNTER — OFFICE (OUTPATIENT)
Dept: URBAN - METROPOLITAN AREA CLINIC 115 | Facility: CLINIC | Age: 70
Setting detail: OPHTHALMOLOGY
End: 2025-07-16
Payer: MEDICARE

## 2025-07-16 DIAGNOSIS — H43.812: ICD-10-CM

## 2025-07-16 DIAGNOSIS — M32.9: ICD-10-CM

## 2025-07-16 DIAGNOSIS — H16.221: ICD-10-CM

## 2025-07-16 DIAGNOSIS — Z79.899: ICD-10-CM

## 2025-07-16 DIAGNOSIS — H16.222: ICD-10-CM

## 2025-07-16 DIAGNOSIS — H01.004: ICD-10-CM

## 2025-07-16 DIAGNOSIS — H01.001: ICD-10-CM

## 2025-07-16 DIAGNOSIS — H25.13: ICD-10-CM

## 2025-07-16 DIAGNOSIS — H16.223: ICD-10-CM

## 2025-07-16 PROCEDURE — 92134 CPTRZ OPH DX IMG PST SGM RTA: CPT | Performed by: OPHTHALMOLOGY

## 2025-07-16 PROCEDURE — 92014 COMPRE OPH EXAM EST PT 1/>: CPT | Performed by: OPHTHALMOLOGY

## 2025-07-16 PROCEDURE — 83861 MICROFLUID ANALY TEARS: CPT | Performed by: OPHTHALMOLOGY

## 2025-07-16 ASSESSMENT — REFRACTION_MANIFEST
OS_CYLINDER: -0.75
OD_CYLINDER: -1.00
OS_ADD: +2.00
OD_AXIS: 090
OD_ADD: +2.00
OS_SPHERE: -0.50
OS_VA1: 20/20
OD_VA1: 20/20
OD_SPHERE: PLANO
OS_AXIS: 080

## 2025-07-16 ASSESSMENT — KERATOMETRY
OD_K1POWER_DIOPTERS: 40.50
OS_AXISANGLE_DEGREES: 141
OS_K2POWER_DIOPTERS: 41.25
OS_K1POWER_DIOPTERS: 41.00
OD_K2POWER_DIOPTERS: 41.50
OD_AXISANGLE_DEGREES: 011

## 2025-07-16 ASSESSMENT — TONOMETRY
OD_IOP_MMHG: 13
OS_IOP_MMHG: 17

## 2025-07-16 ASSESSMENT — REFRACTION_CURRENTRX
OS_OVR_VA: 20/
OD_ADD: +2.00
OS_SPHERE: -0.50
OD_CYLINDER: -1.00
OS_AXIS: 081
OS_CYLINDER: -0.50
OD_VPRISM_DIRECTION: PROGS
OS_VPRISM_DIRECTION: PROGS
OD_OVR_VA: 20/
OD_SPHERE: PLANO
OD_AXIS: 093
OS_ADD: +2.25

## 2025-07-16 ASSESSMENT — CONFRONTATIONAL VISUAL FIELD TEST (CVF)
OS_FINDINGS: FULL
OD_FINDINGS: FULL

## 2025-07-16 ASSESSMENT — LID EXAM ASSESSMENTS
OD_BLEPHARITIS: RUL 1+ 2+
OS_BLEPHARITIS: LUL 1+ 2+

## 2025-07-16 ASSESSMENT — REFRACTION_AUTOREFRACTION
OD_AXIS: 101
OS_SPHERE: -0.25
OD_CYLINDER: -1.00
OS_AXIS: 081
OS_CYLINDER: -1.00
OD_SPHERE: +0.25

## 2025-07-16 ASSESSMENT — SUPERFICIAL PUNCTATE KERATITIS (SPK)
OD_SPK: 1+
OS_SPK: 1+

## 2025-07-16 ASSESSMENT — VISUAL ACUITY
OD_BCVA: 20/20
OS_BCVA: 20/20-